# Patient Record
Sex: FEMALE | Race: WHITE
[De-identification: names, ages, dates, MRNs, and addresses within clinical notes are randomized per-mention and may not be internally consistent; named-entity substitution may affect disease eponyms.]

---

## 2018-10-16 ENCOUNTER — HOSPITAL ENCOUNTER (OUTPATIENT)
Dept: HOSPITAL 75 - PREOP | Age: 23
Discharge: HOME | End: 2018-10-16
Attending: OBSTETRICS & GYNECOLOGY
Payer: COMMERCIAL

## 2018-10-16 VITALS — BODY MASS INDEX: 22.88 KG/M2 | WEIGHT: 109 LBS | HEIGHT: 58 IN

## 2018-10-16 DIAGNOSIS — Z01.818: Primary | ICD-10-CM

## 2018-10-18 ENCOUNTER — HOSPITAL ENCOUNTER (OUTPATIENT)
Dept: HOSPITAL 75 - SDC | Age: 23
Discharge: HOME | End: 2018-10-18
Attending: OBSTETRICS & GYNECOLOGY
Payer: COMMERCIAL

## 2018-10-18 VITALS — DIASTOLIC BLOOD PRESSURE: 59 MMHG | SYSTOLIC BLOOD PRESSURE: 99 MMHG

## 2018-10-18 VITALS — HEIGHT: 58 IN | WEIGHT: 109 LBS | BODY MASS INDEX: 22.88 KG/M2

## 2018-10-18 VITALS — SYSTOLIC BLOOD PRESSURE: 96 MMHG | DIASTOLIC BLOOD PRESSURE: 62 MMHG

## 2018-10-18 VITALS — SYSTOLIC BLOOD PRESSURE: 94 MMHG | DIASTOLIC BLOOD PRESSURE: 67 MMHG

## 2018-10-18 VITALS — SYSTOLIC BLOOD PRESSURE: 109 MMHG | DIASTOLIC BLOOD PRESSURE: 65 MMHG

## 2018-10-18 DIAGNOSIS — N80.1: ICD-10-CM

## 2018-10-18 DIAGNOSIS — Z11.2: ICD-10-CM

## 2018-10-18 DIAGNOSIS — N80.3: Primary | ICD-10-CM

## 2018-10-18 DIAGNOSIS — F17.210: ICD-10-CM

## 2018-10-18 LAB
BASOPHILS # BLD AUTO: 0 10^3/UL (ref 0–0.1)
BASOPHILS NFR BLD AUTO: 0 % (ref 0–10)
EOSINOPHIL # BLD AUTO: 0.1 10^3/UL (ref 0–0.3)
EOSINOPHIL NFR BLD AUTO: 3 % (ref 0–10)
ERYTHROCYTE [DISTWIDTH] IN BLOOD BY AUTOMATED COUNT: 13.7 % (ref 10–14.5)
HCT VFR BLD CALC: 43 % (ref 35–52)
HGB BLD-MCNC: 14.3 G/DL (ref 11.5–16)
LYMPHOCYTES # BLD AUTO: 1.3 X 10^3 (ref 1–4)
LYMPHOCYTES NFR BLD AUTO: 41 % (ref 12–44)
MANUAL DIFFERENTIAL PERFORMED BLD QL: NO
MCH RBC QN AUTO: 29 PG (ref 25–34)
MCHC RBC AUTO-ENTMCNC: 33 G/DL (ref 32–36)
MCV RBC AUTO: 87 FL (ref 80–99)
MONOCYTES # BLD AUTO: 0.3 X 10^3 (ref 0–1)
MONOCYTES NFR BLD AUTO: 8 % (ref 0–12)
NEUTROPHILS # BLD AUTO: 1.5 X 10^3 (ref 1.8–7.8)
NEUTROPHILS NFR BLD AUTO: 47 % (ref 42–75)
PLATELET # BLD: 236 10^3/UL (ref 130–400)
PMV BLD AUTO: 9.6 FL (ref 7.4–10.4)
RBC # BLD AUTO: 4.96 10^6/UL (ref 4.35–5.85)
WBC # BLD AUTO: 3.1 10^3/UL (ref 4.3–11)

## 2018-10-18 PROCEDURE — 94664 DEMO&/EVAL PT USE INHALER: CPT

## 2018-10-18 PROCEDURE — 86900 BLOOD TYPING SEROLOGIC ABO: CPT

## 2018-10-18 PROCEDURE — 86901 BLOOD TYPING SEROLOGIC RH(D): CPT

## 2018-10-18 PROCEDURE — 36415 COLL VENOUS BLD VENIPUNCTURE: CPT

## 2018-10-18 PROCEDURE — 87081 CULTURE SCREEN ONLY: CPT

## 2018-10-18 PROCEDURE — 85025 COMPLETE CBC W/AUTO DIFF WBC: CPT

## 2018-10-18 PROCEDURE — 84703 CHORIONIC GONADOTROPIN ASSAY: CPT

## 2018-10-18 PROCEDURE — 86850 RBC ANTIBODY SCREEN: CPT

## 2018-10-18 RX ADMIN — SODIUM CHLORIDE, SODIUM LACTATE, POTASSIUM CHLORIDE, AND CALCIUM CHLORIDE PRN MLS/HR: 600; 310; 30; 20 INJECTION, SOLUTION INTRAVENOUS at 11:10

## 2018-10-18 RX ADMIN — SODIUM CHLORIDE, SODIUM LACTATE, POTASSIUM CHLORIDE, AND CALCIUM CHLORIDE PRN MLS/HR: 600; 310; 30; 20 INJECTION, SOLUTION INTRAVENOUS at 10:13

## 2018-10-18 NOTE — DISCHARGE INST-WOMEN'S SERVICE
Discharge Inst-Women's Serv


Depart Medication/Instructions


New, Converted or Re-Newed RX:  RX on Chart





Consults/Follow Up


Additional Follow Up:  Yes


Orders/Referrals


Dr. Conklin in 2-3 weeks





Activity


Activity:  Activity as Tolerated


Driving Instructions:  No Driving for 1 Week


NO SMOKING:  NO SMOKING


Nothing Inside Vagina:  No Douching, No Shoals, No Tampons





Diet


Discharge Diet:  No Restrictions


Symptoms to Report to :  Bleeding Excessive, Fever Over 101 Degrees F, 

Vaginal Bleeding Increase, Questions/Concerns


For Any Problems or Questions:  Contact Your Physician





Skin/Wound Care


Infection Signs and Symptoms:  Increased Redness, Foul Odor of Wound, Increased 

Drainage, Skin Itchy or Has a Rash, Increased Swelling, Temperature Above 101  F


Operative Area Clean and Dry:  Keep Incision Clean/Dry


Stitches/Staples/Dermabond:  Dermabond, Care of Stitches


Bathing Instructions:  SEKOU Callaway DO Oct 18, 2018 11:53 am

## 2018-10-18 NOTE — ANESTHESIA-GENERAL POST-OP
General


Patient Condition


Mental Status/LOC:  Same as Preop


Cardiovascular:  Satisfactory


Nausea/Vomiting:  Absent


Respiratory:  Satisfactory


Pain:  Controlled


Complications:  Absent





Post Op Complications


Complications


None





Follow Up Care/Instructions


Patient Instructions


None needed.





Anesthesia/Patient Condition


Patient Condition


Patient was seen after the procedure and she was doing well, no complaints, 

stable vital signs, no apparent adverse anesthesia problems.











SHANIQUA RIGGS DO Oct 18, 2018 18:33

## 2018-10-18 NOTE — OPERATIVE REPORT
DATE OF SERVICE:  10/18/2018



PREOPERATIVE DIAGNOSIS:

A 22-year-old female with chronic pelvic pain.



POSTOPERATIVE DIAGNOSES:

A 22-year-old female with chronic pelvic pain, diffuse peritoneal endometriosis

implants.



PROCEDURE PERFORMED:

Laparoscopic cauterization of endometriosis implants of the pelvic peritoneum.



SURGEON:

Sekou Gaines DO.



ASSISTANT:

ASHWIN Luo.



ANESTHESIA:

General endotracheal.



ESTIMATED BLOOD LOSS:

Minimal.



URINE OUTPUT:

150 mL clear at the end of the procedure.



FLUIDS:

1600 mL lactated Ringer solution.



FINDINGS:

Diffuse dark and clear implant as well as dense scarring of the broad ligament

consistent with diffuse endometriosis, grossly normal appearing bilateral

ovaries and fallopian tubes.



SPECIMENS SENT:

None.



INDICATIONS:

This 22-year-old female patient that has been dealing with chronic pelvic pain

for quite some time, but significantly more so since the birth of her youngest

child.  She has been told to try different birth controls; however, she

continues to get this answer and nobody can give her an answer as to why her

pain is occurring.  She has been told she may have endometriosis, but does not

know the extent of it.  She has had imaging studies in the past, all of which

have found no acute abnormalities noted.  The patient is becoming frustrated

with the amount of pain she is having and it is becoming debilitating and wants

to proceed with more diagnostic and definitive measures.  I discussed with the

patient proceeding with diagnostic laparoscopy, both to give us an answer and to

better tailor her postoperative treatment plan and care.  Risk of the diagnostic

laparoscopy discussed with the patient in detail including bleeding, infection

direct damage to any structures including but not limited to bowel, bladder,

ureter, kidneys, need for possible reoperation, risk from anesthesia and even

death.  After everything was discussed with the patient, consent was obtained in

the preoperative area and the patient was taken to the operating room.



OPERATIVE REPORT IN DETAIL:

Once in the operating room, general anesthesia was found to be adequate, placed

in dorsal lithotomy position, prepped and draped in normal sterile fashion. 

Daugherty catheter was placed using sterile technique.  A weighted speculum was

inserted in the patient's vagina.  A right angle retractor is used to visualize

the cervix, which was then grasped at the 12 o'clock position using a long Allis

clamp.  I then gently sound the uterine cavity depth that was found to be 6 cm. 

I then placed a [employer] uterine manipulator at the depth of 6 cm and used this

as manipulation on bimanual examination.  I then removed all the other

instruments from the patient's vagina except for the Daugherty catheter and the

Kronner uterine manipulator.  I then performed a change of gloves, took my

attention to the abdomen where a supraumbilical area infiltrated using 0.25%

Marcaine, making a 5 mm incision and directed a Veress needle through the

incision until intraperitoneal placement was confirmed using saline drop test. 

I then proceeded with insufflation using CO2 gas.  An opening pressure of 5 mmHg

was noted.  I proceeded to maximum pressure of 15 mmHg, at which point, I

removed the Veress needle and introduced a 5 mm blunt laparoscopic trocar.  Once

this was in place, I am able to confirm intraperitoneal placement using the

laparoscope.  I then had the patient placed in steep Trendelenburg after I

briefly scanned the upper abdominal anatomy which appears to be grossly normal. 

All of my findings listed above were seen in the pelvis.  I have to place a

second trocar and this is suprapubically a 5 mm trocar.  The skin was

infiltrated using 0.25% Marcaine to make a 5 mm incision and introduced the

trocar into the peritoneal cavity under direct visualization of the laparoscope.

 Once I have access in this aspect, I have to take down some adhesions of the

omentum to the anterior abdominal wall which allow to see all of the

endometriosis implants which are then distinctly and individually cauterized

using hook cautery.  Care was taken to stay away from her ureters, fallopian

tubes and from her ovaries except where there were endometriosis implants on her

ovaries.  After all of the implants that I can find and see with a naked eye,

are being cauterized, I then copiously irrigated the pelvis with normal saline

and no active bleeding noted from any of my dissection planes.  This takes

approximately 30 to 45 minutes to cauterize all the endometriosis implants and

then take down the adhesions after which there was no active bleeding noted from

any of my dissection planes.  I removed the instruments from the patient through

the laparoscope and released insufflation to my trocars.  A 10 mL of 0.5%

Marcaine was introduced in the peritoneal cavity for postoperative pain

management.  I then removed the trocars and closed the skin using Dermabond. 

There is no active bleeding noted from these as well.  I then removed the Daugherty

catheter and the Kronner uterine manipulator.  The patient tolerated the

procedure well and was taken to recovery in stable condition.  Lap and sponge

count correct at the end of the procedure, instrument counts correct as well.





Job ID: [id number]

DocumentID: [id number]

Dictated Date:  10/18/2018 12:33:29

Transcription Date: 10/18/2018 20:15:26

Dictated By: SEKOU GAINES DO

## 2018-10-18 NOTE — PROGRESS NOTE-PRE OPERATIVE
Pre-Operative Progress Note


H&P Reviewed


The H&P was reviewed, patient examined and no changes noted.


Date Seen by Provider:  Oct 18, 2018


Time Seen by Provider:  09:33


Date H&P Reviewed:  Oct 18, 2018


Time H&P Reviewed:  09:40


Pre-Operative Diagnosis:  SEKOU CHAPMAN DO Oct 18, 2018 9:33 am

## 2020-06-02 ENCOUNTER — HOSPITAL ENCOUNTER (EMERGENCY)
Dept: HOSPITAL 75 - ER | Age: 25
Discharge: HOME | End: 2020-06-02
Payer: COMMERCIAL

## 2020-06-02 VITALS — DIASTOLIC BLOOD PRESSURE: 63 MMHG | SYSTOLIC BLOOD PRESSURE: 105 MMHG

## 2020-06-02 VITALS — BODY MASS INDEX: 22.49 KG/M2 | WEIGHT: 107.14 LBS | HEIGHT: 57.99 IN

## 2020-06-02 DIAGNOSIS — R55: Primary | ICD-10-CM

## 2020-06-02 DIAGNOSIS — F17.210: ICD-10-CM

## 2020-06-02 DIAGNOSIS — Z88.5: ICD-10-CM

## 2020-06-02 DIAGNOSIS — Z88.8: ICD-10-CM

## 2020-06-02 DIAGNOSIS — Z88.2: ICD-10-CM

## 2020-06-02 LAB
ALBUMIN SERPL-MCNC: 4.2 GM/DL (ref 3.2–4.5)
ALP SERPL-CCNC: 33 U/L (ref 40–136)
ALT SERPL-CCNC: 12 U/L (ref 0–55)
AMORPH SED URNS QL MICRO: (no result) /LPF
APTT PPP: YELLOW S
BACTERIA #/AREA URNS HPF: (no result) /HPF
BASOPHILS # BLD AUTO: 0.1 10^3/UL (ref 0–0.1)
BASOPHILS NFR BLD AUTO: 1 % (ref 0–10)
BILIRUB SERPL-MCNC: 0.2 MG/DL (ref 0.1–1)
BILIRUB UR QL STRIP: NEGATIVE
BUN/CREAT SERPL: 13
CALCIUM SERPL-MCNC: 9.3 MG/DL (ref 8.5–10.1)
CHLORIDE SERPL-SCNC: 106 MMOL/L (ref 98–107)
CO2 SERPL-SCNC: 23 MMOL/L (ref 21–32)
CREAT SERPL-MCNC: 0.75 MG/DL (ref 0.6–1.3)
EOSINOPHIL # BLD AUTO: 0.3 10^3/UL (ref 0–0.3)
EOSINOPHIL NFR BLD AUTO: 4 % (ref 0–10)
ERYTHROCYTE [DISTWIDTH] IN BLOOD BY AUTOMATED COUNT: 13.2 % (ref 10–14.5)
FIBRINOGEN PPP-MCNC: (no result) MG/DL
GFR SERPLBLD BASED ON 1.73 SQ M-ARVRAT: > 60 ML/MIN
GLUCOSE SERPL-MCNC: 89 MG/DL (ref 70–105)
GLUCOSE UR STRIP-MCNC: NEGATIVE MG/DL
HCT VFR BLD CALC: 40 % (ref 35–52)
HGB BLD-MCNC: 13.4 G/DL (ref 11.5–16)
KETONES UR QL STRIP: NEGATIVE
LEUKOCYTE ESTERASE UR QL STRIP: NEGATIVE
LYMPHOCYTES # BLD AUTO: 2.2 X 10^3 (ref 1–4)
LYMPHOCYTES NFR BLD AUTO: 31 % (ref 12–44)
MANUAL DIFFERENTIAL PERFORMED BLD QL: NO
MCH RBC QN AUTO: 30 PG (ref 25–34)
MCHC RBC AUTO-ENTMCNC: 33 G/DL (ref 32–36)
MCV RBC AUTO: 90 FL (ref 80–99)
MONOCYTES # BLD AUTO: 0.5 X 10^3 (ref 0–1)
MONOCYTES NFR BLD AUTO: 7 % (ref 0–12)
NEUTROPHILS # BLD AUTO: 4.1 X 10^3 (ref 1.8–7.8)
NEUTROPHILS NFR BLD AUTO: 57 % (ref 42–75)
NITRITE UR QL STRIP: NEGATIVE
PH UR STRIP: 8 [PH] (ref 5–9)
PLATELET # BLD: 317 10^3/UL (ref 130–400)
PMV BLD AUTO: 9.7 FL (ref 7.4–10.4)
POTASSIUM SERPL-SCNC: 4.4 MMOL/L (ref 3.6–5)
PROT SERPL-MCNC: 6.7 GM/DL (ref 6.4–8.2)
PROT UR QL STRIP: NEGATIVE
RBC #/AREA URNS HPF: (no result) /HPF
SODIUM SERPL-SCNC: 139 MMOL/L (ref 135–145)
SP GR UR STRIP: 1.01 (ref 1.02–1.02)
WBC # BLD AUTO: 7.1 10^3/UL (ref 4.3–11)
WBC #/AREA URNS HPF: (no result) /HPF

## 2020-06-02 PROCEDURE — 84703 CHORIONIC GONADOTROPIN ASSAY: CPT

## 2020-06-02 PROCEDURE — 80053 COMPREHEN METABOLIC PANEL: CPT

## 2020-06-02 PROCEDURE — 81000 URINALYSIS NONAUTO W/SCOPE: CPT

## 2020-06-02 PROCEDURE — 85025 COMPLETE CBC W/AUTO DIFF WBC: CPT

## 2020-06-02 PROCEDURE — 36415 COLL VENOUS BLD VENIPUNCTURE: CPT

## 2020-06-02 PROCEDURE — 93005 ELECTROCARDIOGRAM TRACING: CPT

## 2020-06-02 NOTE — XMS REPORT
Transition of Care/Referral Summary

                             Created on: 2072



AZAEL ROY

External Reference #: [id number]

: 1995

Sex: Female



Demographics





                          Address                   [address] [place]  [address]

 

                          Home Phone                [phone]

 

                          Preferred Language        English

 

                          Marital Status            

 

                          Shinto Affiliation     Samaritan

 

                          Race                      White

 

                          Ethnic Group              Not  or 





Author





                          Author                    [facility]

 

                          Organization              [facility]

 

                          Address                   Unknown

 

                          Phone                     Unavailable







Care Team Providers





                    Care Team Member Name Role                Phone

 

                    No PCP, Pt States   PCP                 [phone]







Encounter





[facility] FIN [id number] Date(s): 16 - 16

[facility] [address] [place] [address]  [phone]
[phone]

Discharge Diagnosis: Acute vaginitis

Discharge Diagnosis: Cervicitis

Discharge Disposition: 01-Home or Self Care

Attending Physician: Kiran Washington MD

Admitting Physician: Kiran Washington MD





Vital Signs





 



                           Most recent to            1



                                         oldest [Reference 



                                         Range]: 

 

 



                           Temperature Oral          36.8 degC



                           [35.8-37.3 degC]          (16 7:56 PM)

 

 



                           Peripheral Pulse          77 bpm



                           Rate [ bpm]         (16 11:22 PM)

 

 



                           Respiratory Rate          18 br/min



                           [14-20 br/min]            (16 11:22 PM)

 

 



                           Blood Pressure            103/70 mmHg



                           [/60-90 mmHg]       (16 11:22 PM)

 

 



                           SpO2                      98 %



                                         (16 11:22 PM)







Problem List





    



              Condition    Effective Dates  Status       Health Status  Informan

t

 

    



                     Tobacco             Active              patient



                                         use(Confirmed)    







Allergies, Adverse Reactions, Alerts





   



                 Substance       Reaction        Severity        Status

 

   



                 codeine         makes her mean  Mild            Active

 

   



                     Rondec              Medium              Active

 

   



                     sulfamethoxazole1   Medium              Active







1makes heart race



Medications





ibuprofen 800 mg oral tablet

800 mg 1 tabs, Oral, q8hr, as needed for pain, # 21 tabs, 0 Refill(s)

Start Date: 10/12/15

Stop Date: 10/19/15

Status: Ordered



Results





Chemistry



 



                           Most recent to            1



                                         oldest [Reference 



                                         Range]: 

 

 



                           Pregnancy Screen,         Negative



                           Urine NPT                 (16 8:37 PM)







Urinalysis



 



                           Most recent to            1



                                         oldest [Reference 



                                         Range]: 

 

 



                           UA Color                  Straw



                                         (16 8:35 PM)

 

 



                           UA Appear                 Clear



                                         (16 8:35 PM)

 

 



                           UA pH [5.0-8.0]           6.0



                                         (16 8:35 PM)

 

 



                           UA Leuk Est               Negative



                           [Negative]                (16 8:35 PM)

 

 



                           UA Nitrite                Negative



                           [Negative]                (16 8:35 PM)

 

 



                           UA Protein                Negative



                           [Negative]                (16 8:35 PM)

 

 



                           UA Glucose                Negative



                           [Negative]                (16 8:35 PM)

 

 



                           UA Ketones                Negative



                           [Negative]                (16 8:35 PM)

 

 



                           UA Urobilinogen           Negative



                           [<1.0]                    (16 8:35 PM)

 

 



                           UA Bili [Negative]        Negative



                                         (16 8:35 PM)

 

 



                           UA Blood [Negative]       Negative



                                         (16 8:35 PM)

 

 



                           UA Spec Grav              1.005



                           [1.003-1.030]             (16 8:35 PM)

 

 



                           Type                      Clean Catch



                                         (16 8:35 PM)







Microbiology Reports







TEST: Affirm Vaginitis Panel

STATUS: Auth (Verified)

BODY SITE: 

SOURCE: Cervix/Vaginal

COLLECTED DATE/TIME: 16 9:07 PM



***Affirm Vaginitis Panel***



Negative for Trichomonas vaginalis

Negative for Gardnerella vaginalis

Negative for Candida species



Immunizations





No data available for this section



Procedures





No data available for this section



Social History





 



                           Social History Type       Response

 

 



                           Smoking Status            Current every day smoker; T

ype: E-Cigarette







Assessment and Plan





No data available for this section

## 2020-06-02 NOTE — XMS REPORT
Continuity of Care Document

                             Created on: 2020



AZAEL ROY

External Reference #: [id number]

: 1995

Sex: Female



Demographics





                          Address                   [address] [place]  [address]

 

                          Home Phone                [phone] x

 

                          Preferred Language        Unknown

 

                          Marital Status            Unknown

 

                          Buddhism Affiliation     Unknown

 

                          Race                      Unknown

 

                          Ethnic Group              Unknown





Author





                          Organization              Unknown

 

                          Address                   Unknown

 

                          Phone                     Unavailable



              



Allergies

      



             Active           Description           Code           Type         

  Severity   

                Reaction           Onset           Reported/Identified          

 

Relationship to Patient                 Clinical Status        

 

             Yes           No Known Drug Allergies           452186           3 

           N/A

             N/A                                                       

  

 

           Yes           codeine                       NKMA           Mild      

     makes 

her mean                           10/07/2014                                   

 

 

           Yes           Rondec                       NKMA           Medium     

      N/A  

                                10/07/2014                                    

 

             Yes           sulfamethoxazole                        NKMA         

  Medium      

             N/A                        10/07/2014                              

   

 

                Yes             carbinoxamine           [id number]           Zana

g Allergy         

           Unknown           N/A                       10/16/2018               

          

      

 

             Yes           codeine           [id number]           Drug Allergy  

         

Unknown           N/A                        10/16/2018                         

  

     

 

                Yes             pseudoephedrine           [id number]           D

rug Allergy       

           Unknown           N/A                       10/16/2018               

        

        

 

                Yes             Sulfa (Sulfonamide Antibiotics)           R03586

0491           

Drug Allergy           Unknown           N/A                             10/16/2

018   

                                                             

 

             Yes           codeine           [id number]           Drug Allergy  

         

Unknown           Pt received hyd                           10/18/2018          

    

                                                 



                                  



Medications

      



                Medication           Packaging           Start Date           St

op Date         

                    Route               Dosage              Sig        

 

                            PROZAC                     ORAL            

6                

                    ORAL                30                              once eac

h day                  



                  



Problems

      



             Date Dx Coded           Attending           Type           Code    

       

Diagnosis                               Diagnosed By        

 

                10/20/2015           David Craven MD           Reason        

   J02.9        

                          Acute pharyngitis, unspecified                    

 

                2016           [facility]           Final           F

17.200          

                          Nicotine dependence, unspecified, uncomplicated       

             

 

             2016           [facility]           Final           N72 

          

Inflammatory disease of cervix uteri                    

 

             2016           [facility]           Final           N76.

0           

Acute vaginitis                                  

 

             2016           [facility]           Reason           R10

.2           

Pelvic and perineal pain                         

 

             2016           RASHIDA DEE, RILEY N           Ot           R10.

2           

PELVIC AND PERINEAL PAIN                         

 

             2016           RASHIDA DEE, RILEY N           Ot           R10.

2           

PELVIC AND PERINEAL PAIN                         

 

             10/05/2016           RILEY FIELD MD N           Ot           R10.

2           

PELVIC AND PERINEAL PAIN                         

 

             10/27/2016           RILEY FIELD MD N           Ot           R10.

2           

PELVIC AND PERINEAL PAIN                         

 

             11/15/2016           RASHIDA DEE, RILEY N           Ot           R10.

2           

PELVIC AND PERINEAL PAIN                         

 

                10/16/2018           SEKOU GAINES DO           Ot          

    Z01.818        

                          ENCOUNTER FOR OTHER PREPROCEDURAL EXAMIN              

      

 

                10/18/2018           SEKOU GAINES DO           Ot          

    F17.210        

                          NICOTINE DEPENDENCE, CIGARETTES, UNCOMPL              

      

 

                10/18/2018           SEKOU GAINES DO S           Ot          

    N80.1          

                          ENDOMETRIOSIS OF OVARY                    

 

                10/18/2018           SEKOU GAINES DO S           Ot          

    N80.3          

                          ENDOMETRIOSIS OF PELVIC PERITONEUM                    

 

                10/18/2018           EDER OSCAR, SEKOU QUINONES           Ot          

    Z11.2          

                          ENCOUNTER FOR SCREENING FOR OTHER BACTER              

      

 

                10/22/2018           SEKOU GAINES DO           Ot          

    F17.210        

                          NICOTINE DEPENDENCE, CIGARETTES, UNCOMPL              

      

 

                10/22/2018           SEKOU GAINES DO           Ot          

    N80.1          

                          ENDOMETRIOSIS OF OVARY                    

 

                10/22/2018           SEKOU GAINES DO           Ot          

    N80.3          

                          ENDOMETRIOSIS OF PELVIC PERITONEUM                    

 

                10/22/2018           SEKOU GAINES DO           Ot          

    Z11.2          

                          ENCOUNTER FOR SCREENING FOR OTHER BACTER              

      



                                                      



Procedures

      



There is no data.                  



Results

      



                    Test                Result              Range        

 

                                        Urine beta human chorionic gonadotropin 

(hCG) measurement - 10/18/18 09:20      

  

 

                          Urine beta human chorionic gonadotropin (hCG) measurem

ent           NEGATIVE    

                                        NEGATIVE        

 

                                        Methicillin resistant Staphylococcus aur

eus (MRSA) screening culture - 10/18/18 

09:20         

 

                          Methicillin resistant Staphylococcus aureus (MRSA) scr

eening culture           

NEG                                     NRG        

 

                                        Complete blood count (CBC) with automate

d white blood cell (WBC) differential - 

10/18/18 09:35         

 

                          Blood leukocytes automated count (number/volume)      

     3.1 10*3/uL          

                                        4.3-11.0        

 

                          Blood erythrocytes automated count (number/volume)    

       4.96 10*6/uL       

                                        4.35-5.85        

 

                    Venous blood hemoglobin measurement (mass/volume)           

14.3 g/dL           

11.5-16.0        

 

                    Blood hematocrit (volume fraction)           43 %           

     35-52        

 

                    Automated erythrocyte mean corpuscular volume           87 [

foz_us]           

80-99        

 

                                        Automated erythrocyte mean corpuscular h

emoglobin (mass per erythrocyte)        

                          29 pg                     25-34        

 

                                        Automated erythrocyte mean corpuscular h

emoglobin concentration measurement 

(mass/volume)             33 g/dL                   32-36        

 

                    Automated erythrocyte distribution width ratio           13.

7 %              10.0-

14.5        

 

                    Automated blood platelet count (count/volume)           236 

10*3/uL           

130-400        

 

                          Automated blood platelet mean volume measurement      

     9.6 [foz_us]         

                                        7.4-10.4        

 

                    Automated blood neutrophils/100 leukocytes           47 %   

             42-75       

 

 

                    Automated blood lymphocytes/100 leukocytes           41 %   

             12-44       

 

 

                    Blood monocytes/100 leukocytes           8 %                

 0-12        

 

                    Automated blood eosinophils/100 leukocytes           3 %    

             0-10        

 

                    Automated blood basophils/100 leukocytes           0 %      

           0-10        

 

                    Blood neutrophils automated count (number/volume)           

1.5 10*3            

1.8-7.8        

 

                    Blood lymphocytes automated count (number/volume)           

1.3 10*3            

1.0-4.0        

 

                    Blood monocytes automated count (number/volume)           0.

3 10*3            

0.0-1.0        

 

                    Automated eosinophil count           0.1 10*3/uL           0

.0-0.3        

 

                    Automated blood basophil count (count/volume)           0.0 

10*3/uL           

0.0-0.1        

 

                                        Blood type T Indirect antibody screen pa

jose alfredo - 10/18/18 09:35         

 

                    ABO+Rh group           OP                  NRG        

 

                    Transfusion band number           [id number]             NRG   

     

 

                    Blood group antibody screen           NEGATIVE            NR

G        



                      



Encounters

      



                ACCT No.           Visit Date/Time           Discharge          

 Status         

             Pt. Type           Provider           Facility           Loc./Unit 

          

Complaint        

 

                    [id number]           2016 19:54:00           

016 23:42:00        

                [facility]             Emergency           [facility]

[facility] ED                   pelvic pain        

 

                    [id number]           10/12/2015 15:12:00           10/12/2

015 17:42:00        

                DIS             Emergency           David Craven MD          

 [facility] ED                   sore throat        

 

                [id number]           2016 12:09:00           2016 23:59:

59           [facility]

                Outpatient           [facility]                         

         

                                                 

 

                [id number]           2016 13:00:00           2016 23:59:

59           [facility]

                Outpatient           [facility]                         

         

                                                 

 

                    [id number]           10/18/2018 09:10:00           10/18/2

018 14:08:00        

                DIS             Outpatient           SEKOU GAINES DO       

    [facility]                       CHRONIC PELVIC PAIN,RT

 ADNEXAL 

PAIN        

 

                    [id number]           10/16/2018 05:42:00           10/16/2

018 11:01:00        

                DIS             Outpatient           SEKOU GAINES DO       

    [facility]           PREOP                     CHRONIC PELVIC PAIN,RT

 ADNEXAL 

PAIN        

 

                    [id number]           10/04/2016 10:15:00           10/04/2

016 23:59:59        

                CLS             Outpatient           RILEY FIELD MD         

  [facility]           RAD                       FEMALE PELVIC PAIN     

   

 

                    [id number]           2016 12:37:00            23:59:59        

                CLS             Outpatient           RILEY FIELD MD         

  [facility]           RAD                                

 

                [id number]           2016 13:23:37           2016 13:2

3:37           

DIS           Outpatient

## 2020-06-02 NOTE — XMS REPORT
Transition of Care/Referral Summary

                             Created on: 2028



AZAEL LONGORIA

External Reference #: [id number]

: 1995

Sex: Female



Demographics





                          Address                   [address] [place]  [address]

 

                          Home Phone                [phone]

 

                          Preferred Language        English

 

                          Marital Status            Single

 

                          Latter day Affiliation     [employer]

 

                          Race                      White

 

                          Ethnic Group              Not  or 





Author





                          Author                    [facility]

 

                          Organization              [facility]

 

                          Address                   Unknown

 

                          Phone                     Unavailable







Encounter





[facility] FIN [id number] Date(s): 10/12/15 - 10/12/15

[facility] [address] [place] [address] [place] [phone]
[phone]

Discharge Diagnosis: Sore throat

Final: Acute pharyngitis, unspecified

Discharge Disposition: 01-Home or Self Care

Attending Physician: David Craven MD

Admitting Physician: David Craven MD





Vital Signs





 



                           Most recent to            1



                                         oldest [Reference 



                                         Range]: 

 

 



                           Temperature Oral          36.9 degC



                           [35.8-37.3 degC]          (10/12/15 3:13 PM)

 

 



                           Peripheral Pulse          82 bpm



                           Rate [ bpm]         (10/12/15 5:41 PM)

 

 



                           Respiratory Rate          16 br/min



                           [14-20 br/min]            (10/12/15 5:41 PM)

 

 



                           Blood Pressure            100/65 mmHg



                           [/60-90 mmHg]       (10/12/15 5:41 PM)

 

 



                           SpO2                      97 %



                                         (10/12/15 5:41 PM)







Problem List





    



              Condition    Effective Dates  Status       Health Status  Informan

t

 

    



                     Tobacco             Active              patient



                                         use(Confirmed)    







Allergies, Adverse Reactions, Alerts





   



                 Substance       Reaction        Severity        Status

 

   



                 codeine         makes her mean  Mild            Active

 

   



                     Rondec              Medium              Active

 

   



                     sulfamethoxazole1   Medium              Active







1makes heart race



Medications





ibuprofen 800 mg oral tablet

800 mg 1 tabs, Oral, q8hr, as needed for pain, # 21 tabs, 0 Refill(s)

Start Date: 10/12/15

Stop Date: 10/19/15

Status: Ordered



Results





No data available for this section



Immunizations





No data available for this section



Procedures





No data available for this section



Social History





 



                           Social History Type       Response

 

 



                           Smoking Status            Current every day smoker; T

ype: E-Cigarette







Assessment and Plan





No data available for this section

## 2020-06-02 NOTE — XMS REPORT
Transition of Care/Referral Summary

                             Created on: 10/14/2119



AZAEL LONGORIA

External Reference #: [id number]

: 1995

Sex: Female



Demographics





                          Address                   [address] [place]  [address]

 

                          Home Phone                [phone]

 

                          Preferred Language        English

 

                          Marital Status            Single

 

                          Anglican Affiliation     [employer]oration

 

                          Race                      White

 

                          Ethnic Group              Not  or 





Author





                          Author                    [facility]

 

                          Organization              [facility]

 

                          Address                   Unknown

 

                          Phone                     Unavailable







Encounter





[facility] FIN [id number] Date(s): 10/12/15 - 10/12/15

[facility] [address] [place] [address] [place] [phone]
[phone]

Discharge Diagnosis: Sore throat

Discharge Disposition: 01-Home or Self Care

Attending Physician: Kiran Washington MD

Admitting Physician: Kiran Washington MD





Vital Signs





 



                           Most recent to            1



                                         oldest [Reference 



                                         Range]: 

 

 



                           Temperature Oral          36.9 degC



                           [35.8-37.3 degC]          (10/12/15 3:13 PM)

 

 



                           Peripheral Pulse          82 bpm



                           Rate [ bpm]         (10/12/15 5:41 PM)

 

 



                           Respiratory Rate          16 br/min



                           [14-20 br/min]            (10/12/15 5:41 PM)

 

 



                           Blood Pressure            100/65 mmHg



                           [/60-90 mmHg]       (10/12/15 5:41 PM)

 

 



                           SpO2                      97 %



                                         (10/12/15 5:41 PM)







Problem List





    



              Condition    Effective Dates  Status       Health Status  Informan

t

 

    



                     Tobacco             Active              patient



                                         use(Confirmed)    







Allergies, Adverse Reactions, Alerts





   



                 Substance       Reaction        Severity        Status

 

   



                 codeine         makes her mean  Mild            Active

 

   



                     Rondec              Medium              Active

 

   



                     sulfamethoxazole1   Medium              Active







1makes heart race



Medications





amoxicillin 500 mg oral tablet

500 mg 1 tabs, Oral, TID, X 10 days, # 30 tabs, 0 Refill(s)

Start Date: 10/12/15

Stop Date: 10/22/15

Status: Ordered



ibuprofen 800 mg oral tablet

800 mg 1 tabs, Oral, q8hr, as needed for pain, # 21 tabs, 0 Refill(s)

Start Date: 10/12/15

Stop Date: 10/19/15

Status: Ordered



predniSONE 50 mg oral tablet

50 mg 1 tabs, Oral, Daily, X 4 days, # 4 tabs, 0 Refill(s)

Start Date: 10/12/15

Stop Date: 10/16/15

Status: Ordered



Results





No data available for this section



Immunizations





No data available for this section



Procedures





No data available for this section



Social History





 



                           Social History Type       Response

 

 



                           Smoking Status            Current every day smoker; T

ype: E-Cigarette







Assessment and Plan





No data available for this section

## 2020-06-02 NOTE — ED CARDIAC GENERAL
History of Present Illness


General


Chief Complaint:  Cardiac/General Problems


Stated Complaint:  DIZZINESS;LOW HR;LOW BP


Nursing Triage Note:  


TO ED PER W/C ACCOMPIED BY STAFF FROM FLOOR PATIENT  IS A AID. WAS SITTING DOWN.




AND  FELT DIZZY AND WEAK TOOK HER B/O AND SYSTOLIC WAS IN THE 90'S AND HR WAS IN




30'S IS ON MEDS FOR IRR HR. DID EAT  BREAKFAST


Source:  patient


Exam Limitations:  no limitations





History of Present Illness


Date Seen by Provider:  Jun 2, 2020


Time Seen by Provider:  11:15


Initial Comments


24-year-old female who presents to the emergency room with complaints of feeling

dizzy, weak after sitting down. 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

and reports that her systolic was in the 90s and her heart rate was in the 30s. 

She reports that she did eat some breakfast this morning but has not had much to

drink. She is on metoprolol already for irregular heart rate. She is a patient 

of Dr. Crane cardiologist and Dr. Desir through [facility]. She denies shortness

of breath or chest pain. She is accompanied by coworker at this time. She repo

rts that she is feeling better at this time.


Timing/Duration:  1 hour


Associated Systoms:  Syncope, Weakness





Allergies and Home Medications


Allergies


Coded Allergies:  


     Sulfa (Sulfonamide Antibiotics) (Verified  Allergy, Unknown, 10/16/18)


     carbinoxamine (Verified  Allergy, Unknown, 10/16/18)


     codeine (Verified  Allergy, Unknown, Pt received hydromorphone & morphine 

w/o issue, 10/18/18)


     pseudoephedrine (Verified  Allergy, Unknown, 10/16/18)





Home Medications


Docusate Sodium 100 Mg Capsule, 100 MG PO BID PRN for CONSTIPATION-1ST LINE


   Prescribed by: SEKOU GAINES on 10/18/18 1155


Hydrocodone Bit/Acetaminophen 1 Tab Tab, 2 TAB PO Q6HR PRN for PAIN-MODERATE


   Prescribed by: SEKOU GAINES on 10/18/18 1155


Ibuprofen 600 Mg Tablet, 600 MG PO Q6H


   Prescribed by: SEKOU GAINES on 10/18/18 1155





Patient Home Medication List


Home Medication List Reviewed:  Yes





Review of Systems


Review of Systems


Constitutional:  see HPI; No chills, No fever; weakness


Cardiovascular:  See HPI, Syncope





All Other Systems Reviewed


Negative Unless Noted:  Yes





Past Medical-Social-Family Hx


Past Med/Social Hx:  Reviewed Nursing Past Med/Soc Hx


Patient Social History


Alcohol Use:  Occasionally Uses


Recreational Drug Use:  No


Smoking Status:  Current Everyday Smoker


Type Used:  Cigarettes


Recent Foreign Travel:  No


Contact w/Someone Who Travel:  No


Recent Infectious Disease Expo:  No


Recent Hopitalizations:  No





Seasonal Allergies


Seasonal Allergies:  Yes





Past Medical History


Surgeries:  Yes (c/s x2, COLON )


Respiratory:  No


Cardiac:  No (IRREGULAR HR )


Neurological:  No


Reproductive Disorders:  Yes


Gastrointestinal:  No


Musculoskeletal:  No


Endocrine:  No


Cancer:  No


Psychosocial:  No


Integumentary:  No


Blood Disorders:  No





Family Medical History


Reviewed Nursing Family Hx





Physical Exam


Vital Signs





Vital Signs - First Documented








 6/2/20 6/2/20





 11:08 13:36


 


Temp 37.0 


 


Pulse 67 


 


Resp 18 


 


B/P (MAP) 107/70 (82) 


 


Pulse Ox 99 


 


O2 Delivery  Room Air





Capillary Refill : Less Than 3 Seconds


Height, Weight, BMI


Height: 4'10.00"


Weight: 109lbs. 0.0oz. 49.808425fx; 22.00 BMI


Method:


General Appearance:  No Apparent Distress, WD/WN


Respiratory:  Chest Non Tender, Lungs Clear, Normal Breath Sounds, No Accessory 

Muscle Use, No Respiratory Distress


Cardiovascular:  Regular Rate, Rhythm, No Edema, No Gallop, No JVD, No Murmur, 

Normal Peripheral Pulses


Gastrointestinal:  Normal Bowel Sounds, No Organomegaly, No Pulsatile Mass, Non 

Tender, Soft


Extremity:  Normal Capillary Refill


Neurologic/Psychiatric:  Alert, Oriented x3


Skin:  Normal Color, Warm/Dry





Progress/Results/Core Measures


Results/Orders


Lab Results





Laboratory Tests








Test


 6/2/20


11:36 6/2/20


11:51 Range/Units


 


 


White Blood Count


 7.1 


 


 4.3-11.0


10^3/uL


 


Red Blood Count


 4.48 


 


 4.35-5.85


10^6/uL


 


Hemoglobin 13.4   11.5-16.0  G/DL


 


Hematocrit 40   35-52  %


 


Mean Corpuscular Volume 90   80-99  FL


 


Mean Corpuscular Hemoglobin 30   25-34  PG


 


Mean Corpuscular Hemoglobin


Concent 33 


 


 32-36  G/DL





 


Red Cell Distribution Width 13.2   10.0-14.5  %


 


Platelet Count


 317 


 


 130-400


10^3/uL


 


Mean Platelet Volume 9.7   7.4-10.4  FL


 


Neutrophils (%) (Auto) 57   42-75  %


 


Lymphocytes (%) (Auto) 31   12-44  %


 


Monocytes (%) (Auto) 7   0-12  %


 


Eosinophils (%) (Auto) 4   0-10  %


 


Basophils (%) (Auto) 1   0-10  %


 


Neutrophils # (Auto) 4.1   1.8-7.8  X 10^3


 


Lymphocytes # (Auto) 2.2   1.0-4.0  X 10^3


 


Monocytes # (Auto) 0.5   0.0-1.0  X 10^3


 


Eosinophils # (Auto)


 0.3 


 


 0.0-0.3


10^3/uL


 


Basophils # (Auto)


 0.1 


 


 0.0-0.1


10^3/uL


 


Sodium Level 139   135-145  MMOL/L


 


Potassium Level 4.4   3.6-5.0  MMOL/L


 


Chloride Level 106     MMOL/L


 


Carbon Dioxide Level 23   21-32  MMOL/L


 


Anion Gap 10   5-14  MMOL/L


 


Blood Urea Nitrogen 10   7-18  MG/DL


 


Creatinine


 0.75 


 


 0.60-1.30


MG/DL


 


Estimat Glomerular Filtration


Rate > 60 


 


  





 


BUN/Creatinine Ratio 13    


 


Glucose Level 89     MG/DL


 


Calcium Level 9.3   8.5-10.1  MG/DL


 


Corrected Calcium 9.1   8.5-10.1  MG/DL


 


Total Bilirubin 0.2   0.1-1.0  MG/DL


 


Aspartate Amino Transf


(AST/SGOT) 16 


 


 5-34  U/L





 


Alanine Aminotransferase


(ALT/SGPT) 12 


 


 0-55  U/L





 


Alkaline Phosphatase 33 L    U/L


 


Total Protein 6.7   6.4-8.2  GM/DL


 


Albumin 4.2   3.2-4.5  GM/DL


 


Urine Color  YELLOW   


 


Urine Clarity  CLOUDY   


 


Urine pH  8.0  5-9  


 


Urine Specific Gravity  1.015 L 1.016-1.022  


 


Urine Protein  NEGATIVE  NEGATIVE  


 


Urine Glucose (UA)  NEGATIVE  NEGATIVE  


 


Urine Ketones  NEGATIVE  NEGATIVE  


 


Urine Nitrite  NEGATIVE  NEGATIVE  


 


Urine Bilirubin  NEGATIVE  NEGATIVE  


 


Urine Urobilinogen  0.2  < = 1.0  MG/DL


 


Urine Leukocyte Esterase  NEGATIVE  NEGATIVE  


 


Urine RBC (Auto)  NEGATIVE  NEGATIVE  


 


Urine RBC  NONE   /HPF


 


Urine WBC  NONE   /HPF


 


Urine Crystals  PRESENT H  /LPF


 


Urine Amorphous Sediment


 


 LARGE NITA


PHOSPHATE H  /LPF





 


Urine Bacteria  TRACE   /HPF


 


Urine Casts  NONE   /LPF


 


Urine Mucus  NEGATIVE   /LPF


 


Urine Culture Indicated  NO   








My Orders





Orders - BERNOT,NATASHA


Ua Culture If Indicated (6/2/20 11:10)


Urine Pregnancy Bedside (6/2/20 11:10)


Ekg Tracing (6/2/20 11:24)


Comprehensive Metabolic Panel (6/2/20 11:24)


Ed Iv/Invasive Line Start (6/2/20 11:24)


Cbc With Automated Diff (6/2/20 11:24)


Ns Iv 1000 Ml (Sodium Chloride 0.9%) (6/2/20 12:30)





Vital Signs/I&O











 6/2/20 6/2/20





 11:08 13:36


 


Temp 37.0 


 


Pulse 67 65


 


Resp 18 18


 


B/P (MAP) 107/70 (82) 105/63


 


Pulse Ox 99 98


 


O2 Delivery  Room Air














Blood Pressure Mean:                    82











Progress


Progress Note :  


   Time:  11:43


Progress Note


Patient reports that last February the cystic mass in her colon that required a 

colon resection and she reports that she has diarrhea or loose stools while the 

time. We'll be evaluating her hydration status.





1310: I seen and evaluated patient. Informed her of her laboratory studies. She 

reports that she is feeling much better at this time. She has some fluids left 

we will of those infuse before discharge. She agrees with plan of care, plans 

for discharge, return precautions were given.





Departure


Impression





   Primary Impression:  


   Vaso vagal episode


Disposition:  01 HOME, SELF-CARE


Condition:  Stable/Unchanged





Departure-Patient Inst.


Decision time for Depature:  12:33


Referrals:  


NO,LOCAL PHYSICIAN (PCP)


Primary Care Physician








FRANKLYN WRIGHT (Family)


Primary Care Physician








KRYSTYNA BAR MD


Patient Instructions:  Vasovagal Response (DC), Near Fainting (DC)





Add. Discharge Instructions:  


Continue your home medications as review sleep prescribed. Follow-up with 

primary care provider and your cardiologist. Call today to schedule appointment 

times. Keep a close eye on your blood pressure and pulse. Return back to the 

emergency room for worsening symptoms or concerns as needed. Be sure to change 

positions slowly to prevent vasovagal episode happening again.





All discharge instructions reviewed with patient and/or family. Voiced 

understanding.











NATASHA THOMPSON                   Jun 2, 2020 11:41

## 2020-06-12 ENCOUNTER — HOSPITAL ENCOUNTER (OUTPATIENT)
Dept: HOSPITAL 75 - CARD | Age: 25
LOS: 90 days | Discharge: HOME | End: 2020-09-10
Attending: INTERNAL MEDICINE
Payer: COMMERCIAL

## 2020-06-12 VITALS — SYSTOLIC BLOOD PRESSURE: 106 MMHG | DIASTOLIC BLOOD PRESSURE: 62 MMHG

## 2020-06-12 VITALS — DIASTOLIC BLOOD PRESSURE: 72 MMHG | SYSTOLIC BLOOD PRESSURE: 96 MMHG

## 2020-06-12 VITALS — DIASTOLIC BLOOD PRESSURE: 58 MMHG | SYSTOLIC BLOOD PRESSURE: 94 MMHG

## 2020-06-12 VITALS — DIASTOLIC BLOOD PRESSURE: 56 MMHG | SYSTOLIC BLOOD PRESSURE: 103 MMHG

## 2020-06-12 VITALS — SYSTOLIC BLOOD PRESSURE: 95 MMHG | DIASTOLIC BLOOD PRESSURE: 52 MMHG

## 2020-06-12 VITALS — DIASTOLIC BLOOD PRESSURE: 56 MMHG | SYSTOLIC BLOOD PRESSURE: 106 MMHG

## 2020-06-12 VITALS — DIASTOLIC BLOOD PRESSURE: 57 MMHG | SYSTOLIC BLOOD PRESSURE: 94 MMHG

## 2020-06-12 VITALS — SYSTOLIC BLOOD PRESSURE: 90 MMHG | DIASTOLIC BLOOD PRESSURE: 49 MMHG

## 2020-06-12 VITALS — DIASTOLIC BLOOD PRESSURE: 46 MMHG | SYSTOLIC BLOOD PRESSURE: 102 MMHG

## 2020-06-12 VITALS — DIASTOLIC BLOOD PRESSURE: 60 MMHG | SYSTOLIC BLOOD PRESSURE: 94 MMHG

## 2020-06-12 VITALS — SYSTOLIC BLOOD PRESSURE: 103 MMHG | DIASTOLIC BLOOD PRESSURE: 65 MMHG

## 2020-06-12 VITALS — BODY MASS INDEX: 23.14 KG/M2 | HEIGHT: 57.99 IN | WEIGHT: 110.23 LBS

## 2020-06-12 VITALS — DIASTOLIC BLOOD PRESSURE: 64 MMHG | SYSTOLIC BLOOD PRESSURE: 96 MMHG

## 2020-06-12 VITALS — DIASTOLIC BLOOD PRESSURE: 65 MMHG | SYSTOLIC BLOOD PRESSURE: 103 MMHG

## 2020-06-12 VITALS — SYSTOLIC BLOOD PRESSURE: 87 MMHG | DIASTOLIC BLOOD PRESSURE: 71 MMHG

## 2020-06-12 VITALS — DIASTOLIC BLOOD PRESSURE: 69 MMHG | SYSTOLIC BLOOD PRESSURE: 101 MMHG

## 2020-06-12 VITALS — SYSTOLIC BLOOD PRESSURE: 101 MMHG | DIASTOLIC BLOOD PRESSURE: 62 MMHG

## 2020-06-12 VITALS — SYSTOLIC BLOOD PRESSURE: 103 MMHG | DIASTOLIC BLOOD PRESSURE: 56 MMHG

## 2020-06-12 VITALS — SYSTOLIC BLOOD PRESSURE: 104 MMHG | DIASTOLIC BLOOD PRESSURE: 62 MMHG

## 2020-06-12 VITALS — DIASTOLIC BLOOD PRESSURE: 61 MMHG | SYSTOLIC BLOOD PRESSURE: 99 MMHG

## 2020-06-12 DIAGNOSIS — Z72.0: ICD-10-CM

## 2020-06-12 DIAGNOSIS — R00.0: Primary | ICD-10-CM

## 2020-06-12 DIAGNOSIS — R55: ICD-10-CM

## 2020-06-12 PROCEDURE — 93660 TILT TABLE EVALUATION: CPT

## 2020-06-12 PROCEDURE — 93306 TTE W/DOPPLER COMPLETE: CPT

## 2020-07-09 NOTE — CARDIOLOGY TILT TABLE TEST
Cardiology-Tilt Table Test


Tilt Table Test


Date


7/9/20


Baseline Vitals


Heart rate 71 BPM and blood pressure 103/56 mmHg.


Vital Signs


Lowest blood pressure of 87/71 mmHg.  Maximum heart rate of 97 BPM.  Patient 

complain of feeling tired however no syncope.


Patient was tilted to 75 degrees for [10] minutes, then returned to supine 

position





During test, patient was:  asymptomatic


In Conclusion;:  Negative Tilt Table Test (equivocal)


Lowest blood pressure of 87/71 mmHg with maximum heart rate of 98 BPM.  

Therefore decrease of systolic blood pressure of more than 10 mmHg and heart 

rate increases off more than 20 BPM, however no syncope or near syncope.  

Continue behavioral modification with keeping oneself well-hydrated.











KRYSTYNA BAR MD              Jul 9, 2020 14:30

## 2021-11-02 ENCOUNTER — HOSPITAL ENCOUNTER (EMERGENCY)
Dept: HOSPITAL 75 - ER | Age: 26
Discharge: HOME | End: 2021-11-02
Payer: COMMERCIAL

## 2021-11-02 VITALS — DIASTOLIC BLOOD PRESSURE: 67 MMHG | SYSTOLIC BLOOD PRESSURE: 123 MMHG

## 2021-11-02 DIAGNOSIS — R00.0: ICD-10-CM

## 2021-11-02 DIAGNOSIS — J06.9: ICD-10-CM

## 2021-11-02 DIAGNOSIS — R55: Primary | ICD-10-CM

## 2021-11-02 DIAGNOSIS — Z20.822: ICD-10-CM

## 2021-11-02 LAB
ALBUMIN SERPL-MCNC: 4.2 GM/DL (ref 3.2–4.5)
ALP SERPL-CCNC: 30 U/L (ref 40–136)
ALT SERPL-CCNC: 7 U/L (ref 0–55)
APTT BLD: 28 SEC (ref 24–35)
BASOPHILS # BLD AUTO: 0.1 10^3/UL (ref 0–0.1)
BASOPHILS NFR BLD AUTO: 1 % (ref 0–10)
BILIRUB SERPL-MCNC: 0.2 MG/DL (ref 0.1–1)
BUN/CREAT SERPL: 10
CALCIUM SERPL-MCNC: 8.6 MG/DL (ref 8.5–10.1)
CHLORIDE SERPL-SCNC: 105 MMOL/L (ref 98–107)
CO2 SERPL-SCNC: 22 MMOL/L (ref 21–32)
CREAT SERPL-MCNC: 0.7 MG/DL (ref 0.6–1.3)
EOSINOPHIL # BLD AUTO: 0.2 10^3/UL (ref 0–0.3)
EOSINOPHIL NFR BLD AUTO: 3 % (ref 0–10)
GFR SERPLBLD BASED ON 1.73 SQ M-ARVRAT: 101 ML/MIN
GLUCOSE SERPL-MCNC: 94 MG/DL (ref 70–105)
HCT VFR BLD CALC: 41 % (ref 35–52)
HGB BLD-MCNC: 13.8 G/DL (ref 11.5–16)
INR PPP: 1 (ref 0.8–1.4)
LYMPHOCYTES # BLD AUTO: 1.7 10^3/UL (ref 1–4)
LYMPHOCYTES NFR BLD AUTO: 22 % (ref 12–44)
MAGNESIUM SERPL-MCNC: 2.1 MG/DL (ref 1.6–2.4)
MANUAL DIFFERENTIAL PERFORMED BLD QL: NO
MCH RBC QN AUTO: 31 PG (ref 25–34)
MCHC RBC AUTO-ENTMCNC: 33 G/DL (ref 32–36)
MCV RBC AUTO: 92 FL (ref 80–99)
MONOCYTES # BLD AUTO: 0.5 10^3/UL (ref 0–1)
MONOCYTES NFR BLD AUTO: 6 % (ref 0–12)
NEUTROPHILS # BLD AUTO: 5 10^3/UL (ref 1.8–7.8)
NEUTROPHILS NFR BLD AUTO: 67 % (ref 42–75)
PLATELET # BLD: 315 10^3/UL (ref 130–400)
PMV BLD AUTO: 9.4 FL (ref 9–12.2)
POTASSIUM SERPL-SCNC: 3.8 MMOL/L (ref 3.6–5)
PROT SERPL-MCNC: 7 GM/DL (ref 6.4–8.2)
PROTHROMBIN TIME: 13.2 SEC (ref 12.2–14.7)
SODIUM SERPL-SCNC: 138 MMOL/L (ref 135–145)
TSH SERPL DL<=0.05 MIU/L-ACNC: 1.97 UIU/ML (ref 0.35–4.94)
WBC # BLD AUTO: 7.4 10^3/UL (ref 4.3–11)

## 2021-11-02 PROCEDURE — 84443 ASSAY THYROID STIM HORMONE: CPT

## 2021-11-02 PROCEDURE — 85730 THROMBOPLASTIN TIME PARTIAL: CPT

## 2021-11-02 PROCEDURE — 83874 ASSAY OF MYOGLOBIN: CPT

## 2021-11-02 PROCEDURE — 93005 ELECTROCARDIOGRAM TRACING: CPT

## 2021-11-02 PROCEDURE — 36415 COLL VENOUS BLD VENIPUNCTURE: CPT

## 2021-11-02 PROCEDURE — 93041 RHYTHM ECG TRACING: CPT

## 2021-11-02 PROCEDURE — 84703 CHORIONIC GONADOTROPIN ASSAY: CPT

## 2021-11-02 PROCEDURE — 84484 ASSAY OF TROPONIN QUANT: CPT

## 2021-11-02 PROCEDURE — 83735 ASSAY OF MAGNESIUM: CPT

## 2021-11-02 PROCEDURE — 85025 COMPLETE CBC W/AUTO DIFF WBC: CPT

## 2021-11-02 PROCEDURE — 87636 SARSCOV2 & INF A&B AMP PRB: CPT

## 2021-11-02 PROCEDURE — 85610 PROTHROMBIN TIME: CPT

## 2021-11-02 PROCEDURE — 71045 X-RAY EXAM CHEST 1 VIEW: CPT

## 2021-11-02 PROCEDURE — 80053 COMPREHEN METABOLIC PANEL: CPT

## 2021-11-02 NOTE — ED SYNCOPE
General


Chief Complaint:  Dizziness/Syncope


Stated Complaint:  CONGESTION / COUGH / SOA/ CHEST PAIN


Nursing Triage Note:  


arrives from [facility] to the ED waiting room after "passing out at Dr office" patient




states she has a histroy of what she thinks is s. a. node dysfunction. Patient 


has a loop recorder with [employer] and they asked her to send a manual upload earlier 


in the day. Patient states when she took her son to the DR for cough and cold 


sx, her heart started racing and she felt faint and passed out. Patient states 


they did an ekg on her "but its been acting up so not sure how reliable it is". 


Currently patient is sitting in waiting room with a friend, pt is alert and 


oriented x4.


Source of Information:  Patient, Old Records


Exam Limitations:  No Limitations





History of Present Illness


Date Seen by Provider:  2021


Time Seen by Provider:  18:16


Initial Comments


This 25-year-old young lady presents to the emergency room as directed by [facility] 

where she reportedly had a very brief syncopal episode.  She has history of SVT 

and is seen by an electrophysiologist  in the [place] area as 

well as Dr. Bailon locally.  She reports having chest tightness and tachycardia

intermittently throughout the day.  On arrival she is in normal sinus rhythm 

with a rate in the 70s.  Chest discomfort has dissipated.





Allergies and Home Medications


Allergies


Coded Allergies:  


     Sulfa (Sulfonamide Antibiotics) (Verified  Allergy, Unknown, 10/16/18)


     carbinoxamine (Verified  Allergy, Unknown, 10/16/18)


     codeine (Verified  Allergy, Unknown, Pt received hydromorphone & morphine 

w/o issue, 10/18/18)


     pseudoephedrine (Verified  Allergy, Unknown, 10/16/18)





Patient Home Medication List


Home Medication List Reviewed:  Yes


Docusate Sodium (Colace) 100 Mg Capsule, 100 MG PO BID PRN for CONSTIPATION-1ST 

LINE


   Prescribed by: SEKOU GAINES on 10/18/18 1155


Hydrocodone Bit/Acetaminophen (Lortab  5 Mg Tablet) 1 Tab Tab, 2 TAB PO Q6HR PRN

for PAIN-MODERATE


   Prescribed by: SEKOU GAINES on 10/18/18 1155


Ibuprofen (Ibuprofen) 600 Mg Tablet, 600 MG PO Q6H


   Prescribed by: SEKOU GAINES on 10/18/18 1155





Review of Systems


Constitutional:  no symptoms reported


EENTM:  see HPI, no symptoms reported, nose congestion


Respiratory:  see HPI, cough, short of breath


Cardiovascular:  see HPI, palpitations, syncope


Gastrointestinal:  no symptoms reported


Genitourinary:  no symptoms reported


Pregnant:  No


Musculoskeletal:  no symptoms reported


Skin:  no symptoms reported


Psychiatric/Neurological:  No Symptoms Reported





Past Medical-Social-Family Hx


Patient Social History


Tobacco Use?:  No


Use of E-Cig and/or Vaping dev:  No


Substance use?:  No


Alcohol Use?:  No


Pt feels they are or have been:  No





Immunizations Up To Date


Tetanus Booster (TDap):  Less than 5yrs


Influenza Vaccine Up-to-Date:  Yes; Up-to-Date


First/Initial COVID19 Vaccinat:  


Second COVID19 Vaccination Tomas:  





Seasonal Allergies


Seasonal Allergies:  Yes





Past Medical History


Surgeries:  Yes (c/s x2, COLON )


Abdominal, Bowel Surgery,  Section


Respiratory:  Yes (REPORTS HEAVY FEELING IN CHEST AND MILD SOB)


Cardiac:  Yes (TACHYCARDIA AND BRADYCARDIA, SVT)


Neurological:  Yes


Headaches /Migraines


Reproductive Disorders:  Yes


Genitourinary:  No


Gastrointestinal:  Yes


Gastroesophageal Reflux


Musculoskeletal:  No


Endocrine:  No


Psychosocial:  No


Integumentary:  No


Blood Disorders:  No


Adverse Reaction/Blood Tranf:  No





Physical Exam


Vital Signs





Vital Signs - First Documented








 21





 18:19


 


Temp 37.1


 


Pulse 86


 


Resp 18


 


B/P (MAP) 120/84 (96)


 


Pulse Ox 99


 


O2 Delivery Room Air





Capillary Refill : Less Than 3 Seconds


Height, Weight, BMI


Height: 4'10.00"


Weight: 109lbs. 0.0oz. 49.680946me; 23.04 BMI


Method:


General Appearance:  No Apparent Distress, WD/WN, Thin


HEENT:  PERRL/EOMI, Normal ENT Inspection, Other (Hyperemic tympanic membranes 

without purulent effusion)


Neck:  Normal Inspection


Cardiovascular:  Regular Rate, Rhythm, No Edema, No Murmur


Respiratory:  Lungs Clear, Normal Breath Sounds, No Accessory Muscle Use, No 

Respiratory Distress


Gastrointestinal:  Normal Bowel Sounds, Non Tender, Soft


Extremities:  Normal Inspection, No Pedal Edema


Neurologic/Psychiatric:  Alert, Oriented x3, No Motor/Sensory Deficits, Normal 

Mood/Affect, CNs II-XII Norm as Tested


Cranial Nerves:  Normal Hearing, Normal Speech, PERRL


Motor/Sensory:  No Motor Deficit, No Sensory Deficit


Skin:  Normal Color, Warm/Dry





Progress/Results/Core Measures


Results/Orders


Lab Results





Laboratory Tests








Test


 21


18:06 21


18:55 Range/Units


 


 


Influenza Type A (RT-PCR) Not Detected   Not Detecte  


 


Influenza Type B (RT-PCR) Not Detected   Not Detecte  


 


SARS-CoV-2 RNA (RT-PCR) Not Detected   Not Detecte  


 


White Blood Count


 


 7.4 


 4.3-11.0


10^3/uL


 


Red Blood Count


 


 4.51 


 3.80-5.11


10^6/uL


 


Hemoglobin  13.8  11.5-16.0  g/dL


 


Hematocrit  41  35-52  %


 


Mean Corpuscular Volume  92  80-99  fL


 


Mean Corpuscular Hemoglobin  31  25-34  pg


 


Mean Corpuscular Hemoglobin


Concent 


 33 


 32-36  g/dL





 


Red Cell Distribution Width  11.9  10.0-14.5  %


 


Platelet Count


 


 315 


 130-400


10^3/uL


 


Mean Platelet Volume  9.4  9.0-12.2  fL


 


Immature Granulocyte % (Auto)  0   %


 


Neutrophils (%) (Auto)  67  42-75  %


 


Lymphocytes (%) (Auto)  22  12-44  %


 


Monocytes (%) (Auto)  6  0-12  %


 


Eosinophils (%) (Auto)  3  0-10  %


 


Basophils (%) (Auto)  1  0-10  %


 


Neutrophils # (Auto)


 


 5.0 


 1.8-7.8


10^3/uL


 


Lymphocytes # (Auto)


 


 1.7 


 1.0-4.0


10^3/uL


 


Monocytes # (Auto)


 


 0.5 


 0.0-1.0


10^3/uL


 


Eosinophils # (Auto)


 


 0.2 


 0.0-0.3


10^3/uL


 


Basophils # (Auto)


 


 0.1 


 0.0-0.1


10^3/uL


 


Immature Granulocyte # (Auto)


 


 0.0 


 0.0-0.1


10^3/uL


 


Prothrombin Time  13.2  12.2-14.7  SEC


 


INR Comment  1.0  0.8-1.4  


 


Activated Partial


Thromboplast Time 


 28 


 24-35  SEC





 


Sodium Level  138  135-145  MMOL/L


 


Potassium Level  3.8  3.6-5.0  MMOL/L


 


Chloride Level  105    MMOL/L


 


Carbon Dioxide Level  22  21-32  MMOL/L


 


Anion Gap  11  5-14  MMOL/L


 


Blood Urea Nitrogen  7  7-18  MG/DL


 


Creatinine


 


 0.70 


 0.60-1.30


MG/DL


 


Estimat Glomerular Filtration


Rate 


 101 


  





 


BUN/Creatinine Ratio  10   


 


Glucose Level  94    MG/DL


 


Calcium Level  8.6  8.5-10.1  MG/DL


 


Corrected Calcium  8.4 L 8.5-10.1  MG/DL


 


Magnesium Level  2.1  1.6-2.4  MG/DL


 


Total Bilirubin  0.2  0.1-1.0  MG/DL


 


Aspartate Amino Transf


(AST/SGOT) 


 14 


 5-34  U/L





 


Alanine Aminotransferase


(ALT/SGPT) 


 7 


 0-55  U/L





 


Alkaline Phosphatase  30 L   U/L


 


Myoglobin


 


 19.8 


 10.0-92.0


NG/ML


 


Troponin I  < 0.028  <0.028  NG/ML


 


Total Protein  7.0  6.4-8.2  GM/DL


 


Albumin  4.2  3.2-4.5  GM/DL


 


TSH Cascade Testing


 


 1.97 


 0.35-4.94


UIU/ML


 


Serum Pregnancy Test,


Qualitative 


 NEGATIVE 


 NEGATIVE  











My Orders





Orders - LEXA CULVER MD


Influenza A And B By Pcr (21 18:18)


Covid 19 Inhouse Test (21 18:18)


Cbc With Automated Diff (21 18:53)


Magnesium (21 18:53)


Chest 1 View, Ap/Pa Only (21 18:53)


Ekg Tracing (21 18:53)


Comprehensive Metabolic Panel (21 18:53)


Myoglobin Serum (21 18:53)


Protime With Inr (21 18:53)


Partial Thromboplastin Time (21 18:53)


O2 (21 18:53)


Monitor-Rhythm Ecg Trace Only (21 18:53)


Ed Iv/Invasive Line Start (21 18:53)


Troponin I (21 18:53)


Hcg,Qualitative Serum (21 18:54)


Thyroid Analyzer (21 18:54)





Vital Signs/I&O











 21





 18:19 20:59


 


Temp 37.1 


 


Pulse 86 81


 


Resp 18 18


 


B/P (MAP) 120/84 (96) 123/67


 


Pulse Ox 99 98


 


O2 Delivery Room Air Room Air














Blood Pressure Mean:                    96











Progress


Progress Note :  


Progress Note


Work-up was unremarkable.  Loop recorder was interrogated.  Patient had some 

sinus tachycardia but no other events.  The interrogation only went back as far 

as the interrogation she performed from home.  We did not have access to that 

report.  Patient was not experiencing any tachycardia or palpitations at the 

time of discharge.  She was advised to follow-up with Dr. Bailon and/or her 

electrophysiologist first thing in the morning.


Initial ECG Impression Date:  Nov 3, 2021


Initial ECG Impression Time:  19:26


Initial ECG Rate:  79


Initial ECG Rhythm:  Normal Sinus


Initial ECG Intervals:  Normal


Initial ECG Impression:  Normal


Comment


Normal sinus rhythm with no ST elevation or depression.  No abnormal intervals 

or axis deviation





Diagnostic Imaging





   Diagonstic Imaging:  Xray


   Plain Films/CT/US/NM/MRI:  chest


Comments


NAME:   AZAEL ROY


MED REC#:   [id number]


ACCOUNT#:   [id number]


PT STATUS:   REG ER


:   1995


PHYSICIAN:   LEXA CULVER MD


ADMIT DATE:   21/ER


                                  ***Signed***


Date of Exam:21





CHEST 1 VIEW, AP/PA ONLY





EXAMINATION: Chest 1 view





HISTORY: Chest pain





COMPARISON: None available.





FINDINGS: 





The lungs are clear without edema or pneumonia. No pleural


effusion or pneumothorax. Heart size is normal. A loop recorder


projects over the heart.





IMPRESSION: 





1. Clear lungs.





Dictated by: 





  Dictated on workstation # [id number]





Dict:   21


Trans:   21


[facility] [id number]





Interpreted by:     JB ANTHONY MD


Electronically signed by: JB ANTHONY MD 21





Departure


Impression





   Primary Impression:  


   Syncope


   Qualified Codes:  R55 - Syncope and collapse


   Additional Impressions:  


   Sinus tachycardia


   Upper respiratory infection


   Qualified Codes:  J06.9 - Acute upper respiratory infection, unspecified


Disposition:  01 HOME, SELF-CARE


Condition:  Improved





Departure-Patient Inst.


Decision time for Depature:  20:50


Referrals:  


JACKSON NEWBY MD (PCP/Family)


Primary Care Physician


Patient Instructions:  Vagal Maneuvers and Their Responses, Viral Upper 

Respiratory Infection, Adult (DC)





Add. Discharge Instructions:  


Please contact Dr. Bailon office or your electrophysiology office first thing 

tomorrow morning.


Reviewed the loop recorder reports with your cardiologist or 

electrophysiologist.


Do not drive, operate machinery, or expose yourself to heights such as ladders 

or other dangerous situations until you are cleared by a cardiologist.


Drink plenty of clear liquids to stay well-hydrated.


If tachycardia returns, try vagal maneuvers such as bearing down or applying a 

bag of ice to your face.  If you have significant symptoms or if these maneuvers

do not stop the tachycardia, please return to the emergency room promptly.


Call with questions or concerns.


Avoid any stimulants such as caffeine, diet pills, energy drinks, workout 

supplements, decongestant medicines, etc.  You may safely use nasal saline, 

Flonase or the generic equivalent, and antihistamine such as Claritin or 

Benadryl.


Return to the ER if you have any other worsening of condition or other concerns.





All discharge instructions reviewed with patient and/or family. Voiced 

understanding.


Work/School Note:  Work Release Form   Date Seen in the Emergency Department:  

2021


   Return to Work:  2021


   Restrictions:  Return-No Fever (24hrs), Return-No Vomiting(24hrs)


   Other Restrictions Listed Below:  No driving, operating machinery, or heights

until cleared.





Copy


Copies To 1:   EMERY BAILON JR, MD


Copies To 2:   DERRICK MAX JOSHUA T MD         2021 19:41

## 2022-01-19 ENCOUNTER — HOSPITAL ENCOUNTER (EMERGENCY)
Dept: HOSPITAL 75 - ER | Age: 27
Discharge: HOME | End: 2022-01-19
Payer: COMMERCIAL

## 2022-01-19 VITALS — WEIGHT: 105.82 LBS | HEIGHT: 57.87 IN | BODY MASS INDEX: 22.21 KG/M2

## 2022-01-19 VITALS — SYSTOLIC BLOOD PRESSURE: 117 MMHG | DIASTOLIC BLOOD PRESSURE: 72 MMHG

## 2022-01-19 DIAGNOSIS — U07.1: Primary | ICD-10-CM

## 2022-01-19 LAB
ALBUMIN SERPL-MCNC: 4.2 GM/DL (ref 3.2–4.5)
ALP SERPL-CCNC: 29 U/L (ref 40–136)
ALT SERPL-CCNC: 10 U/L (ref 0–55)
BASOPHILS # BLD AUTO: 0.1 10^3/UL (ref 0–0.1)
BASOPHILS NFR BLD AUTO: 1 % (ref 0–10)
BILIRUB SERPL-MCNC: 0.2 MG/DL (ref 0.1–1)
BUN/CREAT SERPL: 12
CALCIUM SERPL-MCNC: 8.7 MG/DL (ref 8.5–10.1)
CHLORIDE SERPL-SCNC: 106 MMOL/L (ref 98–107)
CO2 SERPL-SCNC: 20 MMOL/L (ref 21–32)
CREAT SERPL-MCNC: 0.76 MG/DL (ref 0.6–1.3)
EOSINOPHIL # BLD AUTO: 0.2 10^3/UL (ref 0–0.3)
EOSINOPHIL NFR BLD AUTO: 3 % (ref 0–10)
GFR SERPLBLD BASED ON 1.73 SQ M-ARVRAT: 110 ML/MIN
GLUCOSE SERPL-MCNC: 85 MG/DL (ref 70–105)
HCT VFR BLD CALC: 39 % (ref 35–52)
HGB BLD-MCNC: 13.2 G/DL (ref 11.5–16)
LYMPHOCYTES # BLD AUTO: 0.4 10^3/UL (ref 1–4)
LYMPHOCYTES NFR BLD AUTO: 10 % (ref 12–44)
MANUAL DIFFERENTIAL PERFORMED BLD QL: NO
MCH RBC QN AUTO: 30 PG (ref 25–34)
MCHC RBC AUTO-ENTMCNC: 34 G/DL (ref 32–36)
MCV RBC AUTO: 90 FL (ref 80–99)
MONOCYTES # BLD AUTO: 0.3 10^3/UL (ref 0–1)
MONOCYTES NFR BLD AUTO: 7 % (ref 0–12)
NEUTROPHILS # BLD AUTO: 3.4 10^3/UL (ref 1.8–7.8)
NEUTROPHILS NFR BLD AUTO: 78 % (ref 42–75)
PLATELET # BLD: 264 10^3/UL (ref 130–400)
PMV BLD AUTO: 9.2 FL (ref 9–12.2)
POTASSIUM SERPL-SCNC: 3.7 MMOL/L (ref 3.6–5)
PROT SERPL-MCNC: 7 GM/DL (ref 6.4–8.2)
SODIUM SERPL-SCNC: 138 MMOL/L (ref 135–145)
WBC # BLD AUTO: 4.4 10^3/UL (ref 4.3–11)

## 2022-01-19 PROCEDURE — 93005 ELECTROCARDIOGRAM TRACING: CPT

## 2022-01-19 PROCEDURE — 80053 COMPREHEN METABOLIC PANEL: CPT

## 2022-01-19 PROCEDURE — 80162 ASSAY OF DIGOXIN TOTAL: CPT

## 2022-01-19 PROCEDURE — 71045 X-RAY EXAM CHEST 1 VIEW: CPT

## 2022-01-19 PROCEDURE — 85025 COMPLETE CBC W/AUTO DIFF WBC: CPT

## 2022-01-19 PROCEDURE — 85379 FIBRIN DEGRADATION QUANT: CPT

## 2022-01-19 PROCEDURE — 86141 C-REACTIVE PROTEIN HS: CPT

## 2022-01-19 PROCEDURE — 36415 COLL VENOUS BLD VENIPUNCTURE: CPT

## 2022-01-19 PROCEDURE — 84484 ASSAY OF TROPONIN QUANT: CPT

## 2022-01-19 NOTE — ED GENERAL
General


Stated Complaint:  COVID +, CONGESTION,BODY ACHES, CP


Source of Information:  Patient


Exam Limitations:  No Limitations





History of Present Illness


Date Seen by Provider:  2022


Time Seen by Provider:  12:06


Initial Comments


To ER with reports that she tested positive for COVID on Monday, 2022.  She

developed symptoms that day as well.  She felt worse yesterday.  Presents to ER 

today with high heart rate into the 170 range.  She also has shortness of breath

headache neck pain and body aches.  She has a history of nonsustained V. tach as

well as idiopathic supraventricular tachycardia.  She is on midodrine and 

fludrocortisone as her only medications.  History of right hemicolectomy for 

"tumor".  She quit smoking last year.  She follows with Dr. Bailon from 

cardiology here as well as Dr. Parisi from electrophysiology in [place].

 Works as a medical assistant at [employer].


Timing/Duration:  1-2 Days


Severity:  Moderate


Associated Systoms:  Cough, Headaches, Loss of Appetite





Allergies and Home Medications


Allergies


Coded Allergies:  


     Sulfa (Sulfonamide Antibiotics) (Verified  Allergy, Unknown, 10/16/18)


     carbinoxamine (Verified  Allergy, Unknown, 10/16/18)


     codeine (Verified  Allergy, Unknown, Pt received hydromorphone & morphine 

w/o issue, 10/18/18)


     pseudoephedrine (Verified  Allergy, Unknown, 10/16/18)





Patient Home Medication List


Home Medication List Reviewed:  Yes


Docusate Sodium (Colace) 100 Mg Capsule, 100 MG PO BID PRN for CONSTIPATION-1ST 

LINE


   Prescribed by: SEKOU GAINES on 10/18/18 1155


Hydrocodone Bit/Acetaminophen (Lortab  5 Mg Tablet) 1 Tab Tab, 2 TAB PO Q6HR PRN

for PAIN-MODERATE


   Prescribed by: SEKOU GAINES on 10/18/18 1155


Ibuprofen (Ibuprofen) 600 Mg Tablet, 600 MG PO Q6H


   Prescribed by: SEKOU GAINES on 10/18/18 1155





Review of Systems


Review of Systems


Constitutional:  see HPI


EENTM:  see HPI


Respiratory:  see HPI, cough, short of breath


Cardiovascular:  see HPI, palpitations


Genitourinary:  no symptoms reported


Musculoskeletal:  no symptoms reported


Skin:  no symptoms reported


Psychiatric/Neurological:  No Symptoms Reported


Hematologic/Lymphatic:  No Symptoms Reported


Immunological/Allergic:  no symptoms reported





Past Medical-Social-Family Hx


Immunizations Up To Date


Tetanus Booster (TDap):  Less than 5yrs


First/Initial COVID19 Vaccinat:  


Second COVID19 Vaccination Tomas:  





Seasonal Allergies


Seasonal Allergies:  Yes





Past Medical History


Surgeries:  Yes (c/s x2, COLON )


Abdominal, Bowel Surgery,  Section


Respiratory:  Yes (REPORTS HEAVY FEELING IN CHEST AND MILD SOB)


Cardiac:  Yes (TACHYCARDIA AND BRADYCARDIA, SVT)


Neurological:  Yes


Headaches /Migraines


Reproductive Disorders:  Yes


Genitourinary:  No


Gastrointestinal:  Yes


Gastroesophageal Reflux


Musculoskeletal:  No


Endocrine:  No


Psychosocial:  No


Integumentary:  No


Blood Disorders:  No


Adverse Reaction/Blood Tranf:  No





Physical Exam


Vital Signs





Vital Signs - First Documented








 22





 11:55


 


Temp 36.7


 


Pulse 108


 


Resp 19


 


B/P (MAP) 117/72 (87)


 


Pulse Ox 100


 


O2 Delivery Room Air





Capillary Refill :


Height, Weight, BMI


Height: 4'10.00"


Weight: 109lbs. 0.0oz. 49.054258eu; 23.04 BMI


Method:


General Appearance:  No Apparent Distress, WD/WN


Eyes:  Bilateral Eye Normal Inspection, Bilateral Eye PERRL, Bilateral Eye EOMI


HEENT:  PERRL/EOMI, TMs Normal


Neck:  Full Range of Motion, Normal Inspection


Respiratory:  Normal Breath Sounds, No Accessory Muscle Use, No Respiratory 

Distress


Cardiovascular:  Normal Peripheral Pulses, Tachycardia (Heart rate is sinus tach

at 110 on arrival currently down to 82 with rest.  Blood pressure 116/75 oxygen 

100% room air.)


Gastrointestinal:  Non Tender, Soft


Extremity:  Normal Capillary Refill, Normal Inspection


Neurologic/Psychiatric:  Alert, Oriented x3


Skin:  Normal Color, Warm/Dry





Progress/Results/Core Measures


Suspected Sepsis


SIRS


Temperature: 


Pulse:  


Respiratory Rate: 


 


Laboratory Tests


22 12:15: White Blood Count 4.4


Blood Pressure  / 


Mean: 


 





Laboratory Tests


22 12:15: 


Creatinine 0.76, Platelet Count 264, Total Bilirubin 0.2








Results/Orders


Lab Results





Laboratory Tests








Test


 22


12:15 Range/Units


 


 


White Blood Count


 4.4 


 4.3-11.0


10^3/uL


 


Red Blood Count


 4.39 


 3.80-5.11


10^6/uL


 


Hemoglobin 13.2  11.5-16.0  g/dL


 


Hematocrit 39  35-52  %


 


Mean Corpuscular Volume 90  80-99  fL


 


Mean Corpuscular Hemoglobin 30  25-34  pg


 


Mean Corpuscular Hemoglobin


Concent 34 


 32-36  g/dL





 


Red Cell Distribution Width 12.0  10.0-14.5  %


 


Platelet Count


 264 


 130-400


10^3/uL


 


Mean Platelet Volume 9.2  9.0-12.2  fL


 


Immature Granulocyte % (Auto) 1   %


 


Neutrophils (%) (Auto) 78 H 42-75  %


 


Lymphocytes (%) (Auto) 10 L 12-44  %


 


Monocytes (%) (Auto) 7  0-12  %


 


Eosinophils (%) (Auto) 3  0-10  %


 


Basophils (%) (Auto) 1  0-10  %


 


Neutrophils # (Auto)


 3.4 


 1.8-7.8


10^3/uL


 


Lymphocytes # (Auto)


 0.4 L


 1.0-4.0


10^3/uL


 


Monocytes # (Auto)


 0.3 


 0.0-1.0


10^3/uL


 


Eosinophils # (Auto)


 0.2 


 0.0-0.3


10^3/uL


 


Basophils # (Auto)


 0.1 


 0.0-0.1


10^3/uL


 


Immature Granulocyte # (Auto)


 0.0 


 0.0-0.1


10^3/uL


 


D-Dimer


 < 0.27 


 0.00-0.49


UG/ML


 


Sodium Level 138  135-145  MMOL/L


 


Potassium Level 3.7  3.6-5.0  MMOL/L


 


Chloride Level 106    MMOL/L


 


Carbon Dioxide Level 20 L 21-32  MMOL/L


 


Anion Gap 12  5-14  MMOL/L


 


Blood Urea Nitrogen 9  7-18  MG/DL


 


Creatinine


 0.76 


 0.60-1.30


MG/DL


 


Estimat Glomerular Filtration


Rate 110 


  





 


BUN/Creatinine Ratio 12   


 


Glucose Level 85    MG/DL


 


Calcium Level 8.7  8.5-10.1  MG/DL


 


Corrected Calcium 8.5  8.5-10.1  MG/DL


 


Total Bilirubin 0.2  0.1-1.0  MG/DL


 


Aspartate Amino Transf


(AST/SGOT) 12 


 5-34  U/L





 


Alanine Aminotransferase


(ALT/SGPT) 10 


 0-55  U/L





 


Alkaline Phosphatase 29 L   U/L


 


Troponin I < 0.028  <0.028  NG/ML


 


C-Reactive Protein High


Sensitivity 0.31 


 0.00-0.50


MG/DL


 


Total Protein 7.0  6.4-8.2  GM/DL


 


Albumin 4.2  3.2-4.5  GM/DL


 


Digoxin Level


 < 0.30 L


 0.80-2.00


NG/ML








My Orders





Orders - JOSE RAUL OMER


Troponin I Ramesh (22 11:51)


Ekg Tracing (22 11:51)


Cbc With Automated Diff (22 11:51)


Comprehensive Metabolic Panel (22 11:51)


Chest 1 View, Ap/Pa Only (22 11:51)


Fibrin Degradation Products (22 11:51)


Hs C Reactive Protein (22 11:51)


Ed Iv/Invasive Line Start (22 11:51)


Digoxin (22 11:57)


Ketorolac Injection (Toradol Injection) (22 12:15)


Lactated Ringers (Lr 1000 Ml Iv Solution (22 12:30)





Medications Given in ED





Current Medications








 Medications  Dose


 Ordered  Sig/Elie


 Route  Start Time


 Stop Time Status Last Admin


Dose Admin


 


 Ketorolac


 Tromethamine  15 mg  ONCE  ONCE


 IVP  22 12:15


 22 12:16 DC 22 12:32


15 MG








Vital Signs/I&O











 22





 11:55


 


Temp 36.7


 


Pulse 108


 


Resp 19


 


B/P (MAP) 117/72 (87)


 


Pulse Ox 100


 


O2 Delivery Room Air





Capillary Refill :





Departure


Communication (Admissions)


EKG shows sinus rhythm at 95 QTC 4 and 26 ms QRS 82 ms.  No ectopy no ST segment

changes.





1328-Feeling little bit better she says her head does not hurt as much.  She 

received 1 L of IV fluids.  Labs are unremarkable.  We are out of Sotrovimab 

though she is on a wait list for that.  I discussed Paxlovid with her and she is

hesitant to start that without talking to her cardiologist first.





Impression





   Primary Impression:  


   COVID-19


Disposition:  01 HOME, SELF-CARE


Condition:  Stable





Departure-Patient Inst.


Decision time for Depature:  13:20


Referrals:  


JACKSON NEWBY MD (PCP/Family)


Primary Care Physician


Patient Instructions:  COVID-19 Overview





Add. Discharge Instructions:  


1.  Return to ER for any concerns


2.  Follow-up with your doctor next week











JOSE RAUL OMER             2022 12:09

## 2022-01-19 NOTE — DIAGNOSTIC IMAGING REPORT
INDICATION: Cough and fever, COVID.



EXAMINATION: Portable chest at 12:45 p.m.



FINDINGS: There is a loop recorder projecting over the left lower

chest. Heart size and pulmonary vascularity are normal. Lungs are

clear. There are no effusions or pneumothoraces.



IMPRESSION: No acute abnormalities in the chest.



Dictated by: 



  Dictated on workstation # [id number]

## 2022-01-21 ENCOUNTER — HOSPITAL ENCOUNTER (OUTPATIENT)
Dept: HOSPITAL 75 - INFUSION | Age: 27
Discharge: HOME | End: 2022-01-21
Attending: NURSE PRACTITIONER
Payer: COMMERCIAL

## 2022-01-21 VITALS — BODY MASS INDEX: 22.68 KG/M2 | WEIGHT: 108.03 LBS | HEIGHT: 57.99 IN

## 2022-01-21 VITALS — SYSTOLIC BLOOD PRESSURE: 96 MMHG | DIASTOLIC BLOOD PRESSURE: 62 MMHG

## 2022-01-21 VITALS — SYSTOLIC BLOOD PRESSURE: 96 MMHG | DIASTOLIC BLOOD PRESSURE: 66 MMHG

## 2022-01-21 DIAGNOSIS — U07.1: Primary | ICD-10-CM

## 2022-08-31 ENCOUNTER — HOSPITAL ENCOUNTER (OUTPATIENT)
Dept: HOSPITAL 75 - CR | Age: 27
Discharge: HOME | End: 2022-08-31
Attending: INTERNAL MEDICINE
Payer: COMMERCIAL

## 2022-08-31 DIAGNOSIS — R00.0: ICD-10-CM

## 2022-08-31 DIAGNOSIS — Z29.8: Primary | ICD-10-CM

## 2022-08-31 PROCEDURE — 93798 PHYS/QHP OP CAR RHAB W/ECG: CPT

## 2022-09-30 ENCOUNTER — HOSPITAL ENCOUNTER (OUTPATIENT)
Dept: HOSPITAL 75 - CR | Age: 27
Discharge: HOME | End: 2022-09-30
Attending: INTERNAL MEDICINE
Payer: COMMERCIAL

## 2022-09-30 DIAGNOSIS — Z29.8: Primary | ICD-10-CM

## 2022-09-30 DIAGNOSIS — I47.1: ICD-10-CM

## 2022-09-30 PROCEDURE — 93798 PHYS/QHP OP CAR RHAB W/ECG: CPT

## 2022-10-03 ENCOUNTER — HOSPITAL ENCOUNTER (OUTPATIENT)
Dept: HOSPITAL 75 - CR | Age: 27
LOS: 28 days | Discharge: HOME | End: 2022-10-31
Attending: INTERNAL MEDICINE
Payer: COMMERCIAL

## 2022-10-03 DIAGNOSIS — Z29.8: Primary | ICD-10-CM

## 2022-10-03 DIAGNOSIS — I47.1: ICD-10-CM

## 2022-10-03 PROCEDURE — 93798 PHYS/QHP OP CAR RHAB W/ECG: CPT

## 2022-10-30 ENCOUNTER — HOSPITAL ENCOUNTER (EMERGENCY)
Dept: HOSPITAL 75 - ER | Age: 27
Discharge: HOME | End: 2022-10-30
Payer: COMMERCIAL

## 2022-10-30 VITALS — BODY MASS INDEX: 22.63 KG/M2 | HEIGHT: 57.99 IN | WEIGHT: 107.81 LBS

## 2022-10-30 VITALS — SYSTOLIC BLOOD PRESSURE: 98 MMHG | DIASTOLIC BLOOD PRESSURE: 56 MMHG

## 2022-10-30 DIAGNOSIS — R10.2: Primary | ICD-10-CM

## 2022-10-30 DIAGNOSIS — F17.290: ICD-10-CM

## 2022-10-30 LAB
APTT PPP: YELLOW S
BACTERIA #/AREA URNS HPF: NEGATIVE /HPF
BASOPHILS # BLD AUTO: 0 10^3/UL (ref 0–0.1)
BASOPHILS NFR BLD AUTO: 0 % (ref 0–10)
BASOPHILS NFR BLD MANUAL: 0 %
BILIRUB UR QL STRIP: NEGATIVE
BUN/CREAT SERPL: 14
CALCIUM SERPL-MCNC: 9 MG/DL (ref 8.5–10.1)
CHLORIDE SERPL-SCNC: 107 MMOL/L (ref 98–107)
CO2 SERPL-SCNC: 23 MMOL/L (ref 21–32)
CREAT SERPL-MCNC: 0.8 MG/DL (ref 0.6–1.3)
EOSINOPHIL # BLD AUTO: 0 10^3/UL (ref 0–0.3)
EOSINOPHIL NFR BLD AUTO: 0 % (ref 0–10)
EOSINOPHIL NFR BLD MANUAL: 0 %
FIBRINOGEN PPP-MCNC: CLEAR MG/DL
GFR SERPLBLD BASED ON 1.73 SQ M-ARVRAT: 104 ML/MIN
GLUCOSE SERPL-MCNC: 101 MG/DL (ref 70–105)
GLUCOSE UR STRIP-MCNC: NEGATIVE MG/DL
HCT VFR BLD CALC: 36 % (ref 35–52)
HGB BLD-MCNC: 11.9 G/DL (ref 11.5–16)
KETONES UR QL STRIP: NEGATIVE
LEUKOCYTE ESTERASE UR QL STRIP: NEGATIVE
LYMPHOCYTES # BLD AUTO: 0.4 10^3/UL (ref 1–4)
LYMPHOCYTES NFR BLD AUTO: 7 % (ref 12–44)
MANUAL DIFFERENTIAL PERFORMED BLD QL: YES
MCH RBC QN AUTO: 30 PG (ref 25–34)
MCHC RBC AUTO-ENTMCNC: 33 G/DL (ref 32–36)
MCV RBC AUTO: 90 FL (ref 80–99)
MONOCYTES # BLD AUTO: 0.1 10^3/UL (ref 0–1)
MONOCYTES NFR BLD AUTO: 1 % (ref 0–12)
MONOCYTES NFR BLD: 0 %
NEUTROPHILS # BLD AUTO: 5.5 10^3/UL (ref 1.8–7.8)
NEUTROPHILS NFR BLD AUTO: 91 % (ref 42–75)
NEUTS BAND NFR BLD MANUAL: 93 %
NEUTS BAND NFR BLD: 0 %
NITRITE UR QL STRIP: NEGATIVE
PH UR STRIP: 7.5 [PH] (ref 5–9)
PLATELET # BLD: 316 10^3/UL (ref 130–400)
PMV BLD AUTO: 9.1 FL (ref 9–12.2)
POTASSIUM SERPL-SCNC: 4 MMOL/L (ref 3.6–5)
PROT UR QL STRIP: NEGATIVE
RBC #/AREA URNS HPF: (no result) /HPF
RBC MORPH BLD: NORMAL
SODIUM SERPL-SCNC: 137 MMOL/L (ref 135–145)
SP GR UR STRIP: 1.01 (ref 1.02–1.02)
SQUAMOUS #/AREA URNS HPF: (no result) /HPF
VARIANT LYMPHS NFR BLD MANUAL: 7 %
WBC # BLD AUTO: 6 10^3/UL (ref 4.3–11)
WBC #/AREA URNS HPF: (no result) /HPF
YEAST #/AREA URNS HPF: (no result) /HPF

## 2022-10-30 PROCEDURE — 81000 URINALYSIS NONAUTO W/SCOPE: CPT

## 2022-10-30 PROCEDURE — 85027 COMPLETE CBC AUTOMATED: CPT

## 2022-10-30 PROCEDURE — 74177 CT ABD & PELVIS W/CONTRAST: CPT

## 2022-10-30 PROCEDURE — 85007 BL SMEAR W/DIFF WBC COUNT: CPT

## 2022-10-30 PROCEDURE — 36415 COLL VENOUS BLD VENIPUNCTURE: CPT

## 2022-10-30 PROCEDURE — 84703 CHORIONIC GONADOTROPIN ASSAY: CPT

## 2022-10-30 PROCEDURE — 80048 BASIC METABOLIC PNL TOTAL CA: CPT

## 2022-10-30 NOTE — DIAGNOSTIC IMAGING REPORT
PROCEDURE: CT abdomen and pelvis with contrast.



TECHNIQUE: Multiple contiguous axial images were obtained through

the abdomen and pelvis after administration of intravenous

contrast. Auto Exposure Controls were utilized during the CT exam

to meet ALARA standards for radiation dose reduction. All CT

scans use one or more of the following dose optimizing

techniques: automated exposure control, MA and/or KvP adjustment

based on patient size and exam type or iterative reconstruction.



INDICATION: Left lower quadrant pain.



COMPARISON: None available.



FINDINGS: The visualized lung bases are clear. Loop recorder is

identified within the medial left breast. The liver and spleen

are unremarkable. The adrenal glands are unremarkable. The

pancreas is unremarkable. The gallbladder is unremarkable. The

kidneys are unremarkable. No aneurysmal dilatation of the

abdominal aorta. Postsurgical changes are noted involving the

bowel within the right abdomen, particularly the colon. No

significant inflammatory stranding is seen within the right lower

quadrant. 2.6 cm cystic structure is noted within the right

adnexa. The left adnexa is unremarkable for age. The uterus is

unremarkable. The urinary bladder is unremarkable. No bowel

obstruction or pneumatosis. No significant adenopathy, free air

or free fluid within the abdomen or pelvis. Scattered osseous

degenerative changes without acute osseous abnormality.



IMPRESSION: 

1. 2.6 cm right adnexal cystic structure. This may simply relate

to a dominant follicle, though hemorrhagic cyst or other ovarian

cyst would be additional considerations. Pelvic ultrasound could

help to further evaluate, as clinically indicated.

2. Postsurgical changes involving the right colon without bowel

obstruction or definite evidence of acute appendicitis.

3. Additional findings as above.



Dictated by: 



  Dictated on workstation # [id number]

## 2022-10-30 NOTE — ED ABDOMINAL PAIN
General


Chief Complaint:  Abdominal/GI Problems


Stated Complaint:  PELVIC PAIN


Nursing Triage Note:  


pt arrived pov with complaints of pelvic pain that started wednesday afternoon. 


pt describes pain as sharp, shooting, throbbing with numbness. pt took tramadol 


and a muscle relaxers at 1400.


Source of Information:  Patient


Exam Limitations:  No Limitations





History of Present Illness


Date Seen by Provider:  Oct 30, 2022


Time Seen by Provider:  14:25


Initial Comments


Patient is a 26-year-old female who presents to the emergency room with a chief 

complaint of left-sided pelvic and lower quadrant abdominal pain.  Onset of pain

Wednesday of last week.  She states it is causing some discomfort in her left 

hip and some "numbness" down her anterior left thigh.  She states she has a 

history of ovarian cysts and endometriosis with previous laparoscopic surgery 

for endometriosis.  She has had a previous right hemicolectomy due to a 

"submucosal tumor" that she states was benign.  2 prior C-sections.  She states 

she went to [facility] clinic on Thursday morning and was told by the provider there 

that she may have an enlarged ovarian cyst.  She complains of some burning with 

urination that has been chronic for 2 months, she has follow-up with a nurse 

practitioner at the urologist office at [facility] in a couple of 

months in [place].





No fevers or chills.  Mild nausea.  She is taking tramadol for the pain, her 

last dose was a couple of hours ago.  She is also on Lyrica and muscle relaxer. 

Last normal bowel movement this afternoon, nonblack nonbloody.  She is 

approximately 2-1/2 weeks past her last menstrual cycle.  She has an  

implanted birth control in her right upper extremity.  She is occasionally 

sexually active.





All other review of systems reviewed and negative except as stated


Timing/Duration:  4-5 Days


Severity/Quality:  Moderate, Cramping


Location:  LLQ


Radiation:  Other (left thigh)


Activities at Onset:  None


Modifying Factors:  Worsens With Movement, Worsens With Palpation


Associated Symptoms:  Nausea/Vomiting





Allergies and Home Medications


Allergies


Coded Allergies:  


     Sulfa (Sulfonamide Antibiotics) (Verified  Allergy, Unknown, 10/16/18)


     carbinoxamine (Verified  Allergy, Unknown, 10/16/18)


     codeine (Verified  Allergy, Unknown, Pt received hydromorphone & morphine 

w/o issue, 10/18/18)


     pseudoephedrine (Verified  Allergy, Unknown, 10/16/18)





Patient Home Medication List


Home Medication List Reviewed:  Yes


Docusate Sodium (Colace) 100 Mg Capsule, 100 MG PO BID PRN for CONSTIPATION-1ST 

LINE


   Prescribed by: SEKOU GAINES on 10/18/18 1155


Hydrocodone Bit/Acetaminophen (Lortab  5 Mg Tablet) 1 Tab Tab, 2 TAB PO Q6HR PRN

for PAIN-MODERATE


   Prescribed by: SEKOU GAINES on 10/18/18 1155


Ibuprofen (Ibuprofen) 600 Mg Tablet, 600 MG PO Q6H


   Prescribed by: SEKOU GAINES on 10/18/18 1155





Review of Systems


Review of Systems


Constitutional:  see HPI


EENTM:  No Symptoms Reported


Respiratory:  No Symptoms Reported


Cardiovascular:  No Symptoms Reported


Gastrointestinal:  Abdominal Pain


Genitourinary:  Burning


Musculoskeletal:  other (left thigh; "numbness")





All Other Systems Reviewed


Negative Unless Noted:  Yes





Past Medical-Social-Family Hx


Patient Social History


Tobacco Use?:  Yes


E-Cig or Vaping type used:  Nicotine


Substance use?:  No


Alcohol Use?:  No





Immunizations Up To Date


Tetanus Booster (TDap):  Less than 5yrs


First/Initial COVID19 Vaccinat:  


Second COVID19 Vaccination Tomas:  


Third COVID19 Vaccination Date:  





Seasonal Allergies


Seasonal Allergies:  Yes





Past Medical History


Surgery/Hospitalization HX:  


LOOP RECORDER, HX SVT


Surgeries:  Yes (c/s x2, COLON )


Abdominal, Bowel Surgery,  Section


Respiratory:  Yes (REPORTS HEAVY FEELING IN CHEST AND MILD SOB)


Cardiac:  Yes (TACHYCARDIA AND BRADYCARDIA, SVT)


Neurological:  Yes


Headaches /Migraines


Last Menstrual Period:  Oct 12, 2022


Reproductive Disorders:  Yes


Genitourinary:  No


Gastrointestinal:  Yes


Gastroesophageal Reflux


Musculoskeletal:  No


Endocrine:  No


Psychosocial:  No


Integumentary:  No


Blood Disorders:  No


Adverse Reaction/Blood Tranf:  No





Physical Exam


Vital Signs





Vital Signs - First Documented








 10/30/22





 14:14


 


Temp 37.0


 


Pulse 75


 


B/P (MAP) 125/75 (92)


 


Pulse Ox 99


 


O2 Delivery Room Air





Capillary Refill :


Height/Weight/BMI


Height: 4'10.00"


Weight: 109lbs. 0.0oz. 49.365853qt; 22.00 BMI


Method:


General Appearance:  WD/WN, no apparent distress


Neck:  normal inspection


Respiratory:  lungs clear, normal breath sounds, no respiratory distress, no 

accessory muscle use


Cardiovascular:  regular rate, rhythm


Gastrointestinal:  normal bowel sounds, soft, tenderness (LLQ with palpable 

"mass" in the LLQ, tender, feels mobile)


Extremities:  normal range of motion, non-tender, normal inspection, no pedal 

edema, no calf tenderness


Neurologic/Psychiatric:  alert, normal mood/affect, oriented x 3


Skin:  normal color, warm/dry





Progress/Results/Core Measures


Results/Orders


Lab Results





Laboratory Tests








Test


 10/30/22


14:45 10/30/22


14:50 Range/Units


 


 


Urine Color YELLOW    


 


Urine Clarity CLEAR    


 


Urine pH 7.5   5-9  


 


Urine Specific Gravity 1.010 L  1.016-1.022  


 


Urine Protein NEGATIVE   NEGATIVE  


 


Urine Glucose (UA) NEGATIVE   NEGATIVE  


 


Urine Ketones NEGATIVE   NEGATIVE  


 


Urine Nitrite NEGATIVE   NEGATIVE  


 


Urine Bilirubin NEGATIVE   NEGATIVE  


 


Urine Urobilinogen 0.2   < = 1.0  MG/DL


 


Urine Leukocyte Esterase NEGATIVE   NEGATIVE  


 


Urine RBC (Auto) NEGATIVE   NEGATIVE  


 


Urine RBC NONE    /HPF


 


Urine WBC 0-2    /HPF


 


Urine Squamous Epithelial


Cells 2-5 


 


  /HPF





 


Urine Crystals NONE    /LPF


 


Urine Bacteria NEGATIVE    /HPF


 


Urine Casts NONE    /LPF


 


Urine Mucus NEGATIVE    /LPF


 


Urine Yeast FEW H   /HPF


 


Urine Culture Indicated NO    


 


White Blood Count


 


 6.0 


 4.3-11.0


10^3/uL


 


Red Blood Count


 


 4.00 


 3.80-5.11


10^6/uL


 


Hemoglobin  11.9  11.5-16.0  g/dL


 


Hematocrit  36  35-52  %


 


Mean Corpuscular Volume  90  80-99  fL


 


Mean Corpuscular Hemoglobin  30  25-34  pg


 


Mean Corpuscular Hemoglobin


Concent 


 33 


 32-36  g/dL





 


Red Cell Distribution Width  12.9  10.0-14.5  %


 


Platelet Count


 


 316 


 130-400


10^3/uL


 


Mean Platelet Volume  9.1  9.0-12.2  fL


 


Immature Granulocyte % (Auto)  0   %


 


Neutrophils (%) (Auto)  91 H 42-75  %


 


Lymphocytes (%) (Auto)  7 L 12-44  %


 


Monocytes (%) (Auto)  1  0-12  %


 


Eosinophils (%) (Auto)  0  0-10  %


 


Basophils (%) (Auto)  0  0-10  %


 


Neutrophils # (Auto)


 


 5.5 


 1.8-7.8


10^3/uL


 


Lymphocytes # (Auto)


 


 0.4 L


 1.0-4.0


10^3/uL


 


Monocytes # (Auto)


 


 0.1 


 0.0-1.0


10^3/uL


 


Eosinophils # (Auto)


 


 0.0 


 0.0-0.3


10^3/uL


 


Basophils # (Auto)


 


 0.0 


 0.0-0.1


10^3/uL


 


Immature Granulocyte # (Auto)


 


 0.0 


 0.0-0.1


10^3/uL


 


Neutrophils % (Manual)  93   %


 


Lymphocytes % (Manual)  7   %


 


Monocytes % (Manual)  0   %


 


Eosinophils % (Manual)  0   %


 


Basophils % (Manual)  0   %


 


Band Neutrophils  0   %


 


Blood Morphology Comment  NORMAL   


 


Sodium Level  137  135-145  MMOL/L


 


Potassium Level  4.0  3.6-5.0  MMOL/L


 


Chloride Level  107    MMOL/L


 


Carbon Dioxide Level  23  21-32  MMOL/L


 


Anion Gap  7  5-14  MMOL/L


 


Blood Urea Nitrogen  11  7-18  MG/DL


 


Creatinine


 


 0.80 


 0.60-1.30


MG/DL


 


Estimat Glomerular Filtration


Rate 


 104 


  





 


BUN/Creatinine Ratio  14   


 


Glucose Level  101    MG/DL


 


Calcium Level  9.0  8.5-10.1  MG/DL








My Orders





Orders - JANICE GRIFFIN MD


Ed Iv/Invasive Line Start (10/30/22 14:34)


Cbc With Automated Diff (10/30/22 14:34)


Basic Metabolic Panel (10/30/22 14:34)


Ua Culture If Indicated (10/30/22 14:34)


Urine Pregnancy Bedside (10/30/22 14:34)


Ns Iv 1000 Ml (Sodium Chloride 0.9%) (10/30/22 14:45)


Ketorolac Injection (Toradol Injection) (10/30/22 14:45)


Manual Differential (10/30/22 14:50)


Ct Abdomen/Pelvis W (10/30/22 15:24)


Iohexol Injection (Omnipaque 350 Mg/Ml 1 (10/30/22 15:45)


Received Contrast (Hold Metformin- Contr (10/30/22 15:45)


Ns (Ivpb) (Sodium Chloride 0.9% Ivpb Bag (10/30/22 15:45)





Medications Given in ED





Current Medications








 Medications  Dose


 Ordered  Sig/Elie


 Route  Start Time


 Stop Time Status Last Admin


Dose Admin


 


 Iohexol  100 ml  ONCE  ONCE


 IV  10/30/22 15:45


 10/30/22 15:46 DC 10/30/22 15:50


57 ML


 


 Ketorolac


 Tromethamine  15 mg  ONCE  ONCE


 IVP  10/30/22 14:45


 10/30/22 14:46 DC 10/30/22 14:58


15 MG


 


 Sodium Chloride  100 ml  ONCE  ONCE


 IV  10/30/22 15:45


 10/30/22 15:46 DC 10/30/22 15:50


80 ML








Vital Signs/I&O











 10/30/22





 14:14


 


Temp 37.0


 


Pulse 75


 


B/P (MAP) 125/75 (92)


 


Pulse Ox 99


 


O2 Delivery Room Air














Blood Pressure Mean:                    92











Progress


Progress Note :  


   Time:  16:35


Progress Note


Patient got minimal relief from the Toradol.  Her CT is reviewed and radiologist

interpretation shows a 2.6 cm cyst in the right adnexa.  No abnormal findings in

the left adnexa.  No free pelvic fluid.  No other signs of acute surgical 

process.  Labs are reviewed and are all within normal limits.  Urine is not 

infected.  She now tells me that she not only has tramadol at home for pain but 

also oxycodone as needed for more severe pain.  I have encouraged her to take 

stool softeners/MiraLAX.  I suspect the "mass" that I am palpating in the left 

lower quadrant is actually colon.  She does have quite a bit of stool on CT in 

the left abdomen.  She is not febrile or vomiting.  No concerning findings for 

bowel obstruction.  No concerning findings for intussusception/volvulus/severe 

colitis.  No suspicion for pyelonephritis or concerns for PID/tubo-ovarian 

abscess on the left.





Patient is counseled on fluid intake, pain management with NSAIDs and follow-up 

with her primary care.  She verbalized understanding.  All questions are sought 

and answered.





Diagnostic Imaging





   Diagonstic Imaging:  CT


Comments


                 [facility]


                                [place], [place]





NAME:   AZAEL ROY


MED REC#:   [id number]


ACCOUNT#:   [id number]


PT STATUS:   REG ER


:   1995


PHYSICIAN:   JANICE GRIFFIN MD


ADMIT DATE:   10/30/22/ER


                                   ***Draft***


Date of Exam:10/30/22





CT ABDOMEN/PELVIS W








PROCEDURE: CT abdomen and pelvis with contrast.





TECHNIQUE: Multiple contiguous axial images were obtained through


the abdomen and pelvis after administration of intravenous


contrast. Auto Exposure Controls were utilized during the CT exam


to meet ALARA standards for radiation dose reduction. All CT


scans use one or more of the following dose optimizing


techniques: automated exposure control, MA and/or KvP adjustment


based on patient size and exam type or iterative reconstruction.





INDICATION: Left lower quadrant pain.





COMPARISON: None available.





FINDINGS: The visualized lung bases are clear. Loop recorder is


identified within the medial left breast. The liver and spleen


are unremarkable. The adrenal glands are unremarkable. The


pancreas is unremarkable. The gallbladder is unremarkable. The


kidneys are unremarkable. No aneurysmal dilatation of the


abdominal aorta. Postsurgical changes are noted involving the


bowel within the right abdomen, particularly the colon. No


significant inflammatory stranding is seen within the right lower


quadrant. 2.6 cm cystic structure is noted within the right


adnexa. The left adnexa is unremarkable for age. The uterus is


unremarkable. The urinary bladder is unremarkable. No bowel


obstruction or pneumatosis. No significant adenopathy, free air


or free fluid within the abdomen or pelvis. Scattered osseous


degenerative changes without acute osseous abnormality.





IMPRESSION: 


1. 2.6 cm right adnexal cystic structure. This may simply relate


to a dominant follicle, though hemorrhagic cyst or other ovarian


cyst would be additional considerations. Pelvic ultrasound could


help to further evaluate, as clinically indicated.


2. Postsurgical changes involving the right colon without bowel


obstruction or definite evidence of acute appendicitis.


3. Additional findings as above.





  Dictated on workstation # [id number]








Dict:   10/30/22 [id number]


Trans:   10/30/22 1606


[facility] [id number]





Interpreted by:     GEORGIA ALEJO MD


Electronically signed by:





Departure


Impression





   Primary Impression:  


   Abdominal pain


   Qualified Codes:  R10.32 - Left lower quadrant pain


Disposition:  01 HOME, SELF-CARE


Condition:  Stable





Departure-Patient Inst.


Decision time for Depature:  16:33


Referrals:  


JACKSON NEWBY MD (PCP/Family)


Primary Care Physician


Patient Instructions:  Abdominal Pain, Adult ED





Add. Discharge Instructions:  


Drink plenty of fluids to stay well-hydrated.





Take over-the-counter naproxen, 2 tablets with food every 12 hours as needed for

pain.





If you develop a fever or vomiting or any other emergent, concerning symptoms 

please return to the emergency department for reevaluation.





Follow-up with your primary care provider as needed.











JANICE GRIFFIN MD         Oct 30, 2022 14:44

## 2024-06-12 NOTE — DIAGNOSTIC IMAGING REPORT
EXAMINATION: Chest 1 view



HISTORY: Chest pain



COMPARISON: None available.



FINDINGS: 



The lungs are clear without edema or pneumonia. No pleural

effusion or pneumothorax. Heart size is normal. A loop recorder

projects over the heart.



IMPRESSION: 



1. Clear lungs.



Dictated by: 



  Dictated on workstation # [id number] wheezing